# Patient Record
Sex: MALE | Race: WHITE | NOT HISPANIC OR LATINO | Employment: PART TIME | URBAN - METROPOLITAN AREA
[De-identification: names, ages, dates, MRNs, and addresses within clinical notes are randomized per-mention and may not be internally consistent; named-entity substitution may affect disease eponyms.]

---

## 2018-04-04 ENCOUNTER — COMMUNICATION - HEALTHEAST (OUTPATIENT)
Dept: FAMILY MEDICINE | Facility: CLINIC | Age: 18
End: 2018-04-04

## 2018-05-04 ENCOUNTER — OFFICE VISIT - HEALTHEAST (OUTPATIENT)
Dept: FAMILY MEDICINE | Facility: CLINIC | Age: 18
End: 2018-05-04

## 2018-05-04 DIAGNOSIS — F32.A DEPRESSION: ICD-10-CM

## 2018-05-08 ENCOUNTER — COMMUNICATION - HEALTHEAST (OUTPATIENT)
Dept: FAMILY MEDICINE | Facility: CLINIC | Age: 18
End: 2018-05-08

## 2018-05-08 DIAGNOSIS — F32.A DEPRESSION: ICD-10-CM

## 2019-05-02 ENCOUNTER — COMMUNICATION - HEALTHEAST (OUTPATIENT)
Dept: FAMILY MEDICINE | Facility: CLINIC | Age: 19
End: 2019-05-02

## 2019-05-02 DIAGNOSIS — F32.A DEPRESSION: ICD-10-CM

## 2019-08-01 ENCOUNTER — COMMUNICATION - HEALTHEAST (OUTPATIENT)
Dept: FAMILY MEDICINE | Facility: CLINIC | Age: 19
End: 2019-08-01

## 2019-08-01 DIAGNOSIS — F32.A DEPRESSION: ICD-10-CM

## 2019-08-30 ENCOUNTER — COMMUNICATION - HEALTHEAST (OUTPATIENT)
Dept: FAMILY MEDICINE | Facility: CLINIC | Age: 19
End: 2019-08-30

## 2019-08-30 DIAGNOSIS — F32.A DEPRESSION: ICD-10-CM

## 2019-10-06 ENCOUNTER — COMMUNICATION - HEALTHEAST (OUTPATIENT)
Dept: FAMILY MEDICINE | Facility: CLINIC | Age: 19
End: 2019-10-06

## 2019-10-06 DIAGNOSIS — F32.A DEPRESSION: ICD-10-CM

## 2019-10-11 ENCOUNTER — COMMUNICATION - HEALTHEAST (OUTPATIENT)
Dept: FAMILY MEDICINE | Facility: CLINIC | Age: 19
End: 2019-10-11

## 2019-10-11 DIAGNOSIS — F32.A DEPRESSION: ICD-10-CM

## 2019-11-11 ENCOUNTER — COMMUNICATION - HEALTHEAST (OUTPATIENT)
Dept: FAMILY MEDICINE | Facility: CLINIC | Age: 19
End: 2019-11-11

## 2019-11-11 DIAGNOSIS — F32.A DEPRESSION: ICD-10-CM

## 2019-12-14 ENCOUNTER — COMMUNICATION - HEALTHEAST (OUTPATIENT)
Dept: FAMILY MEDICINE | Facility: CLINIC | Age: 19
End: 2019-12-14

## 2019-12-14 ENCOUNTER — COMMUNICATION - HEALTHEAST (OUTPATIENT)
Dept: SCHEDULING | Facility: CLINIC | Age: 19
End: 2019-12-14

## 2019-12-14 DIAGNOSIS — F32.A DEPRESSION: ICD-10-CM

## 2019-12-20 ENCOUNTER — COMMUNICATION - HEALTHEAST (OUTPATIENT)
Dept: SCHEDULING | Facility: CLINIC | Age: 19
End: 2019-12-20

## 2019-12-24 ENCOUNTER — OFFICE VISIT - HEALTHEAST (OUTPATIENT)
Dept: FAMILY MEDICINE | Facility: CLINIC | Age: 19
End: 2019-12-24

## 2019-12-24 DIAGNOSIS — Z23 NEED FOR VACCINATION: ICD-10-CM

## 2019-12-24 DIAGNOSIS — F32.A DEPRESSION, UNSPECIFIED DEPRESSION TYPE: ICD-10-CM

## 2019-12-24 ASSESSMENT — PATIENT HEALTH QUESTIONNAIRE - PHQ9: SUM OF ALL RESPONSES TO PHQ QUESTIONS 1-9: 3

## 2020-01-06 ENCOUNTER — COMMUNICATION - HEALTHEAST (OUTPATIENT)
Dept: FAMILY MEDICINE | Facility: CLINIC | Age: 20
End: 2020-01-06

## 2020-01-06 DIAGNOSIS — F32.A DEPRESSION: ICD-10-CM

## 2020-01-22 ENCOUNTER — COMMUNICATION - HEALTHEAST (OUTPATIENT)
Dept: FAMILY MEDICINE | Facility: CLINIC | Age: 20
End: 2020-01-22

## 2020-01-22 DIAGNOSIS — F32.A DEPRESSION: ICD-10-CM

## 2020-11-30 ENCOUNTER — VIRTUAL VISIT (OUTPATIENT)
Dept: FAMILY MEDICINE | Facility: OTHER | Age: 20
End: 2020-11-30

## 2020-12-01 NOTE — PROGRESS NOTES
"Date: 2020 17:45:01  Clinician: Pamela Diego  Clinician NPI: [id number]  Patient: Mick Leos  Patient : 2000  Patient Address: [address] [place] [address]  Patient Phone: [phone]  Visit Protocol: URI  Patient Summary:  Mick is a 20 year old ( : 2000 ) male who initiated a [facility] Visit for COVID-19 (Coronavirus) evaluation and screening. When asked the question \"Please sign me up to receive news, health information and promotions. \", Mick responded \"No\".    When asked when his symptoms started, Mick reported that he does not have any symptoms.   He denies taking antibiotic medication in the past month and having recent facial or sinus surgery in the past 60 days.    Pertinent COVID-19 (Coronavirus) information  Mick does not work or volunteer as healthcare worker or a . In the past 14 days, Mick has not worked or volunteered at a healthcare facility or group living setting.   In the past 14 days, he also has not lived in a congregate living setting.   Mick has not had a close contact with a laboratory-confirmed COVID-19 patient within the last 14 days.    Since 2019, Mick has not been tested for COVID-19 and has had upper respiratory infection (URI) or influenza-like illness.      Date(s) of previous URI or influenza-like illness (free-text): 10/11/20-20     Symptoms Mick experienced during previous URI or influenza-like illness as reported by the patient (free-text): Dry throat, runny nose, aching        Pertinent medical history  He has not been told by his provider to avoid NSAIDs.   Mick does not have diabetes. He denies having immunosuppressive conditions (e.g., chemotherapy, HIV, organ transplant, long-term use of steroids or other immunosuppressive medications, splenectomy). He does not have severe COPD and congestive heart failure. He does not have asthma.   Mick does not need a return to " work/school note.   Weight: 165 lbs   Mick does not smoke or use smokeless tobacco.   Weight: 165 lbs    MEDICATIONS: No current medications, ALLERGIES: NKDA  Clinician Response:  Dear Mick,   Based on the details you've shared, you are concerned about contact with someone who may have been exposed to coronavirus (COVID-19). Based on [facility] guidelines you do not need to be tested at this time.  Testing for people who do not have symptoms is only required in certain instances, including direct contact with a person who has tested positive for COVID-19, or for those who are traveling to locations where testing is required beforehand.  Please redo an [facility] visit or call your clinic if you think we missed needed information, your exposure information has changed, or you develop symptoms.  What are the symptoms of COVID-19?  The most common symptoms are cough, fever and trouble breathing. Less common symptoms include headache, body aches, fatigue (feeling very tired), chills, sore throat, stuffy or runny nose, diarrhea (loose poop), loss of taste or smell, belly pain, and nausea or vomiting (feeling sick to your stomach or throwing up).  Where can I get more information?  [facility] -- About COVID-19: [email or web address]  CDC -- What to Do If You're Sick: www.cdc.gov/coronavirus/2019-ncov/about/steps-when-sick.html  CDC -- Ending Home Isolation: www.cdc.gov/coronavirus/2019-ncov/hcp/disposition-in-home-patients.html  CDC -- Caring for Someone: www.cdc.gov/coronavirus/2019-ncov/if-you-are-sick/care-for-someone.html  [facility] -- Interim Guidance for Hospital Discharge to Home: www.health.[facility].mn.us/diseases/coronavirus/hcp/hospdischarge.pdf  [facility] clinical trials (COVID-19 research studies): [email or web address].[email or web address]/umn-clinical-trials  Below are the COVID-19 hotlines at the [place] Department of Health ([facility]). Interpreters are available.  For health questions: Call  [phone] or 1-364.871.4826 (7 a.m. to 7 p.m.)  For questions about schools and childcare: Call 370-686-9469 or 1-973.724.8512 (7 a.m. to 7 p.m.)    Diagnosis: Worries  Diagnosis ICD: R45.82

## 2021-05-26 ASSESSMENT — PATIENT HEALTH QUESTIONNAIRE - PHQ9: SUM OF ALL RESPONSES TO PHQ QUESTIONS 1-9: 3

## 2021-05-28 NOTE — TELEPHONE ENCOUNTER
RN cannot approve Refill Request    RN can NOT refill this medication not protocol approved for ages 21 and younger      Socrates Florez, [facility] Triage/Med Refill 2/12/2019

## 2021-05-31 NOTE — TELEPHONE ENCOUNTER
RN cannot approve Refill Request    RN can NOT refill this medication PCP messaged that patient is overdue for Office Visit. Last office visit: 5/4/2018 Lev Gardner MD Last Physical: Visit date not found Last MTM visit: Visit date not found Last visit same specialty: 5/4/2018 Lev Gardner MD.  Next visit within 3 mo: Visit date not found  Next physical within 3 mo: Visit date not found      Brandi Nuñez, [facility] Triage/Med Refill 8/1/2019    Requested Prescriptions   Pending Prescriptions Disp Refills     sertraline (ZOLOFT) 50 MG tablet [Pharmacy Med Name: SERTRALINE HCL 50 MG TABLET] 90 tablet 0     Sig: TAKE 1 TABLET BY MOUTH EVERY DAY       [facility] Refill Protocol  Failed - 8/1/2019  1:45 AM        Failed - PCP or prescribing provider visit in last year     Last office visit with prescriber/PCP: 5/4/2018 Lev Gardner MD OR manuel dept: Visit date not found OR same specialty: 5/4/2018 Lev Gardner MD  Last physical: Visit date not found Last MTM visit: Visit date not found   Next visit within 3 mo: Visit date not found  Next physical within 3 mo: Visit date not found  Prescriber OR PCP: Lev Gardner MD  Last diagnosis associated with med order: 1. Depression  - sertraline (ZOLOFT) 50 MG tablet [Pharmacy Med Name: SERTRALINE HCL 50 MG TABLET]; TAKE 1 TABLET BY MOUTH EVERY DAY  Dispense: 90 tablet; Refill: 0    If protocol passes may refill for 12 months if within 3 months of last provider visit (or a total of 15 months).             Failed - Age 21 and younger route to prescribing provider     Last office visit with prescriber/PCP: 5/4/2018 Lev Gardner MD OR manuel dept: Visit date not found OR same specialty: 5/4/2018 Lev Gardner MD  Last physical: Visit date not found Last MTM visit: Visit date not found   Next visit within 3 mo: Visit date not found  Next physical within 3 mo: Visit date not found  Prescriber OR PCP: Lev Gardner MD  Last diagnosis associated with med  order: 1. Depression  - sertraline (ZOLOFT) 50 MG tablet [Pharmacy Med Name: SERTRALINE HCL 50 MG TABLET]; TAKE 1 TABLET BY MOUTH EVERY DAY  Dispense: 90 tablet; Refill: 0    If protocol passes may refill for 12 months if within 3 months of last provider visit (or a total of 15 months).

## 2021-05-31 NOTE — TELEPHONE ENCOUNTER
RN cannot approve Refill Request    RN can NOT refill this medication med is not covered by policy/route to provider.       Ilene Palma, [facility] Triage/Med Refill 8/30/2019    Requested Prescriptions   Pending Prescriptions Disp Refills     sertraline (ZOLOFT) 50 MG tablet [Pharmacy Med Name: SERTRALINE HCL 50 MG TABLET] 30 tablet 0     Sig: TAKE 1 TABLET BY MOUTH EVERY DAY. NO MORE REFILLS UNTIL SEEN       [facility] Refill Protocol  Failed - 8/30/2019  7:32 AM        Failed - PCP or prescribing provider visit in last year     Last office visit with prescriber/PCP: 5/4/2018 Lev Gardner MD OR same dept: Visit date not found OR same specialty: 5/4/2018 Lev Gardner MD  Last physical: Visit date not found Last MTM visit: Visit date not found   Next visit within 3 mo: Visit date not found  Next physical within 3 mo: Visit date not found  Prescriber OR PCP: Lev Gardner MD  Last diagnosis associated with med order: 1. Depression  - sertraline (ZOLOFT) 50 MG tablet [Pharmacy Med Name: SERTRALINE HCL 50 MG TABLET]; TAKE 1 TABLET BY MOUTH EVERY DAY. NO MORE REFILLS UNTIL SEEN  Dispense: 30 tablet; Refill: 0    If protocol passes may refill for 12 months if within 3 months of last provider visit (or a total of 15 months).             Failed - Age 21 and younger route to prescribing provider     Last office visit with prescriber/PCP: 5/4/2018 Lev Gardner MD OR manuel dept: Visit date not found OR same specialty: 5/4/2018 Lev Gardner MD  Last physical: Visit date not found Last MTM visit: Visit date not found   Next visit within 3 mo: Visit date not found  Next physical within 3 mo: Visit date not found  Prescriber OR PCP: Lev Gardner MD  Last diagnosis associated with med order: 1. Depression  - sertraline (ZOLOFT) 50 MG tablet [Pharmacy Med Name: SERTRALINE HCL 50 MG TABLET]; TAKE 1 TABLET BY MOUTH EVERY DAY. NO MORE REFILLS UNTIL SEEN  Dispense: 30 tablet; Refill: 0    If protocol passes  may refill for 12 months if within 3 months of last provider visit (or a total of 15 months).

## 2021-06-01 ENCOUNTER — RECORDS - HEALTHEAST (OUTPATIENT)
Dept: ADMINISTRATIVE | Facility: CLINIC | Age: 21
End: 2021-06-01

## 2021-06-01 VITALS — WEIGHT: 149 LBS | BODY MASS INDEX: 21.38 KG/M2

## 2021-06-02 NOTE — TELEPHONE ENCOUNTER
RN cannot approve Refill Request    RN can NOT refill this medication Protocol failed and NO refill given.      Ilene Palma, [facility] Triage/Med Refill 10/8/2019    Requested Prescriptions   Pending Prescriptions Disp Refills     sertraline (ZOLOFT) 50 MG tablet [Pharmacy Med Name: SERTRALINE HCL 50 MG TABLET] 30 tablet 0     Sig: TAKE 1 TABLET BY MOUTH EVERY DAY. NO MORE REFILLS UNTIL SEEN       [facility] Refill Protocol  Failed - 10/6/2019  9:33 AM        Failed - PCP or prescribing provider visit in last year     Last office visit with prescriber/PCP: 5/4/2018 Lev Gardner MD OR same dept: Visit date not found OR same specialty: 5/4/2018 Lev Gardner MD  Last physical: Visit date not found Last MTM visit: Visit date not found   Next visit within 3 mo: Visit date not found  Next physical within 3 mo: Visit date not found  Prescriber OR PCP: Lev Gardner MD  Last diagnosis associated with med order: 1. Depression  - sertraline (ZOLOFT) 50 MG tablet [Pharmacy Med Name: SERTRALINE HCL 50 MG TABLET]; TAKE 1 TABLET BY MOUTH EVERY DAY. NO MORE REFILLS UNTIL SEEN  Dispense: 30 tablet; Refill: 0    If protocol passes may refill for 12 months if within 3 months of last provider visit (or a total of 15 months).             Failed - Age 21 and younger route to prescribing provider     Last office visit with prescriber/PCP: 5/4/2018 Lev Gardner MD OR same dept: Visit date not found OR same specialty: 5/4/2018 Lev Gardner MD  Last physical: Visit date not found Last MTM visit: Visit date not found   Next visit within 3 mo: Visit date not found  Next physical within 3 mo: Visit date not found  Prescriber OR PCP: Lev Gardner MD  Last diagnosis associated with med order: 1. Depression  - sertraline (ZOLOFT) 50 MG tablet [Pharmacy Med Name: SERTRALINE HCL 50 MG TABLET]; TAKE 1 TABLET BY MOUTH EVERY DAY. NO MORE REFILLS UNTIL SEEN  Dispense: 30 tablet; Refill: 0    If protocol passes may refill  for 12 months if within 3 months of last provider visit (or a total of 15 months).

## 2021-06-02 NOTE — TELEPHONE ENCOUNTER
RN cannot approve Refill Request    RN can NOT refill this medication med is not covered by policy/route to provider. Last office visit: 5/4/2018 Lev Gardner MD Last Physical: Visit date not found Last MTM visit: Visit date not found Last visit same specialty: 5/4/2018 Lev Gardner MD.  Next visit within 3 mo: Visit date not found  Next physical within 3 mo: Visit date not found      Eleanor Jose, [facility] Triage/Med Refill 10/13/2019    Requested Prescriptions   Pending Prescriptions Disp Refills     sertraline (ZOLOFT) 50 MG tablet [Pharmacy Med Name: SERTRALINE HCL 50 MG TABLET] 30 tablet 0     Sig: TAKE 1 TABLET BY MOUTH EVERY DAY. NO MORE REFILLS UNTIL SEEN       [facility] Refill Protocol  Failed - 10/11/2019 11:23 AM        Failed - PCP or prescribing provider visit in last year     Last office visit with prescriber/PCP: 5/4/2018 Lev Gardner MD OR manuel dept: Visit date not found OR same specialty: 5/4/2018 Lev Gardner MD  Last physical: Visit date not found Last MTM visit: Visit date not found   Next visit within 3 mo: Visit date not found  Next physical within 3 mo: Visit date not found  Prescriber OR PCP: Lev Gardner MD  Last diagnosis associated with med order: 1. Depression  - sertraline (ZOLOFT) 50 MG tablet [Pharmacy Med Name: SERTRALINE HCL 50 MG TABLET]; TAKE 1 TABLET BY MOUTH EVERY DAY. NO MORE REFILLS UNTIL SEEN  Dispense: 30 tablet; Refill: 0    If protocol passes may refill for 12 months if within 3 months of last provider visit (or a total of 15 months).             Failed - Age 21 and younger route to prescribing provider     Last office visit with prescriber/PCP: 5/4/2018 Lev Gardner MD OR manuel dept: Visit date not found OR same specialty: 5/4/2018 Lev Gardner MD  Last physical: Visit date not found Last MTM visit: Visit date not found   Next visit within 3 mo: Visit date not found  Next physical within 3 mo: Visit date not found  Prescriber OR PCP:  Lev Gardner MD  Last diagnosis associated with med order: 1. Depression  - sertraline (ZOLOFT) 50 MG tablet [Pharmacy Med Name: SERTRALINE HCL 50 MG TABLET]; TAKE 1 TABLET BY MOUTH EVERY DAY. NO MORE REFILLS UNTIL SEEN  Dispense: 30 tablet; Refill: 0    If protocol passes may refill for 12 months if within 3 months of last provider visit (or a total of 15 months).

## 2021-06-03 NOTE — TELEPHONE ENCOUNTER
RN cannot approve Refill Request    RN can NOT refill this medication med is not covered by policy/route to provider. Last office visit: 5/4/2018 Lev Gardner MD Last Physical: Visit date not found Last MTM visit: Visit date not found Last visit same specialty: 5/4/2018 Lev Gardner MD.  Next visit within 3 mo: Visit date not found  Next physical within 3 mo: Visit date not found      Sarah Ivey, [facility] Triage/Med Refill 11/11/2019    Requested Prescriptions   Pending Prescriptions Disp Refills     sertraline (ZOLOFT) 50 MG tablet [Pharmacy Med Name: SERTRALINE HCL 50 MG TABLET] 30 tablet 0     Sig: TAKE 1 TABLET BY MOUTH EVERY DAY. NO MORE REFILLS UNTIL SEEN       [facility] Refill Protocol  Failed - 11/11/2019  9:33 AM        Failed - PCP or prescribing provider visit in last year     Last office visit with prescriber/PCP: 5/4/2018 Lev Gardner MD OR manuel dept: Visit date not found OR same specialty: 5/4/2018 Lev Gardner MD  Last physical: Visit date not found Last MTM visit: Visit date not found   Next visit within 3 mo: Visit date not found  Next physical within 3 mo: Visit date not found  Prescriber OR PCP: Lev Gardner MD  Last diagnosis associated with med order: 1. Depression  - sertraline (ZOLOFT) 50 MG tablet [Pharmacy Med Name: SERTRALINE HCL 50 MG TABLET]; TAKE 1 TABLET BY MOUTH EVERY DAY. NO MORE REFILLS UNTIL SEEN  Dispense: 30 tablet; Refill: 0    If protocol passes may refill for 12 months if within 3 months of last provider visit (or a total of 15 months).             Failed - Age 21 and younger route to prescribing provider     Last office visit with prescriber/PCP: 5/4/2018 Lev Gardner MD OR manuel dept: Visit date not found OR same specialty: 5/4/2018 Lev Gardner MD  Last physical: Visit date not found Last MTM visit: Visit date not found   Next visit within 3 mo: Visit date not found  Next physical within 3 mo: Visit date not found  Prescriber OR PCP:  Lev Gardner MD  Last diagnosis associated with med order: 1. Depression  - sertraline (ZOLOFT) 50 MG tablet [Pharmacy Med Name: SERTRALINE HCL 50 MG TABLET]; TAKE 1 TABLET BY MOUTH EVERY DAY. NO MORE REFILLS UNTIL SEEN  Dispense: 30 tablet; Refill: 0    If protocol passes may refill for 12 months if within 3 months of last provider visit (or a total of 15 months).

## 2021-06-04 VITALS
BODY MASS INDEX: 26.43 KG/M2 | HEART RATE: 65 BPM | WEIGHT: 184.2 LBS | OXYGEN SATURATION: 97 % | DIASTOLIC BLOOD PRESSURE: 64 MMHG | SYSTOLIC BLOOD PRESSURE: 99 MMHG

## 2021-06-04 NOTE — TELEPHONE ENCOUNTER
Refill Request  Did you contact pharmacy: Yes  Medication name:   Requested Prescriptions     Pending Prescriptions Disp Refills     sertraline (ZOLOFT) 50 MG tablet 30 tablet 0     Who prescribed the medication: Kendra  Pharmacy Name and Location: [facility]  Is patient out of medication: No.  1 days left  Patient notified refills processed in 72 hours:  yes  Okay to leave a detailed message: yes    Briana Canseco RN, BSN [facility] Triage Nurse

## 2021-06-04 NOTE — PATIENT INSTRUCTIONS - HE
As discussed, wean off of the sertraline, if you are doing well we can keep you off of this.  See us in the next 1 to 2 years for a physical though sooner as needed.

## 2021-06-04 NOTE — TELEPHONE ENCOUNTER
Patient calling back as he states he just received a call. Unsure if someone called or if it is a reminder for his appointment on the 24th.  Last documentation in chart was from 12/16 but pt was already aware he needed appointment for his zoloft rx.   Pt stated he will listen to voicemail and will call back if needed.   Carole Roger, RN   [facility] RN Triage    Reason for Disposition    [1] Follow-up call to recent contact AND [2] information only call, no triage required    Protocols used: INFORMATION ONLY CALL-A-AH

## 2021-06-04 NOTE — TELEPHONE ENCOUNTER
RN cannot approve Refill Request    RN can NOT refill this medication PCP messaged that patient is overdue for Office Visit. Last office visit: 5/4/2018 Lev Gardner MD Last Physical: Visit date not found Last MTM visit: Visit date not found Last visit same specialty: 5/4/2018 Lev Gardner MD.  Next visit within 3 mo: Visit date not found  Next physical within 3 mo: Visit date not found    Patient is coming in on 12/24/19 for a med check.   Briana Canseco, [facility] Triage/Med Refill 12/14/2019    Requested Prescriptions   Pending Prescriptions Disp Refills     sertraline (ZOLOFT) 50 MG tablet 30 tablet 0       There is no refill protocol information for this order

## 2021-06-04 NOTE — TELEPHONE ENCOUNTER
RN Triage:     Patient calling in requesting a refill of zoloft, He has one pill left. Please see refill encounter.     Briana Canseco RN, BSN [facility] Triage Nurse

## 2021-06-04 NOTE — PROGRESS NOTES
ASSESSMENT:  1. Depression  Patient with a longstanding history of depression, though is not clear if he truly needs sertraline, he weaned off BuSpar without any difficulty.    2. Need for vaccination     - Tdap vaccine,  6yo or older,  IM        PLAN:  1.  Wean off sertraline, currently 50 mg daily, take half a tablet or 25 mg daily for 2 weeks then 25 mg every other day for 2 weeks then discontinue.  2.  Discussed with the patient that over the next several months I think it will become clear if he is able to do well without the sertraline.  3.  Dap  4.  Patient will be due for a physical in the next 1 to 2 years, see earlier as needed.    Orders Placed This Encounter   Procedures     Tdap vaccine,  6yo or older,  IM     Medications Discontinued During This Encounter   Medication Reason     busPIRone (BUSPAR) 15 MG tablet Therapy completed     sertraline (ZOLOFT) 50 MG tablet Therapy completed       Return in about 3 months (around 3/24/2020) for Annual physical.    CHIEF COMPLAINT:  Chief Complaint   Patient presents with     Depression     recheck        SUBJECTIVE:  Mick is a 19 y.o. male coming in clinic today for a depression recheck. He was last seen in clinic 05/04/2018 for an office visit about depression.    Depression/Medication: The patient reports that he is in a better position mentally than his last visit. He is currently still taking the medication, but feels that it did not help much. He is willing to try and wean off the medications.     Health Maintenance: He is updating shots today. He had gotten his flu shot at his dad's workplace clinic. He reports being bedridden for a few days after getting the flu shot.    Review of Systems:   All other systems are negative.     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   He dropped out of college. If he were to ever go back to school he would want to go for an .  He is currently in a relationship.  His mom has  depression.  His brother has anxiety.    Immunization History   Administered Date(s) Administered     DTaP, historic 2000, 2000, 01/20/1999, 08/18/1999, 07/01/2003     Dtap 2000, 2000, 01/20/1999, 08/18/1999, 07/01/2003     Hep A, Adult IM (19yr & older) 08/04/2009, 04/09/2010     Hep A, historic 08/04/2009, 04/09/2010     Hep B / HiB 2000, 2000, 05/12/1999     IPV 05/02/2000, 07/01/2003     Influenza, Seasonal, Inj PF IIV3 11/07/2011     Influenza, inj, historic,unspecified 12/10/2008     Influenza,seasonal, Inj IIV3 12/09/2006, 12/10/2008     MMR 05/12/1999, 07/01/2003     Meningococcal MCV4P 05/23/2011, 03/27/2015     OPV,Trivalent,Historic([id number] only) 2000, 2000, 01/20/1999     Td,adult,historic,unspecified 08/04/2009     Tdap 08/04/2009, 12/24/2019     Varicella 11/05/1999, 08/04/2009     Social History     Socioeconomic History     Marital status: Single     Spouse name: Not on file     Number of children: Not on file     Years of education: Not on file     Highest education level: Not on file   Occupational History     Occupation: change oil   Social Needs     Financial resource strain: Not on file     Food insecurity:     Worry: Not on file     Inability: Not on file     Transportation needs:     Medical: Not on file     Non-medical: Not on file   Tobacco Use     Smoking status: Never Smoker     Smokeless tobacco: Never Used   Substance and Sexual Activity     Alcohol use: Not on file     Drug use: Not on file     Sexual activity: Not on file   Lifestyle     Physical activity:     Days per week: Not on file     Minutes per session: Not on file     Stress: Not on file   Relationships     Social connections:     Talks on phone: Not on file     Gets together: Not on file     Attends Shinto service: Not on file     Active member of club or organization: Not on file     Attends meetings of clubs or organizations: Not on file     Relationship status: Not on  file     Intimate partner violence:     Fear of current or ex partner: Not on file     Emotionally abused: Not on file     Physically abused: Not on file     Forced sexual activity: Not on file   Other Topics Concern     Not on file   Social History Narrative    Diet-        Exercise-     History reviewed. No pertinent past medical history.  Family History   Problem Relation Age of Onset     Depression Mother      Anxiety disorder Brother        MEDICATIONS:  Current Outpatient Medications   Medication Sig Dispense Refill     ibuprofen (ADVIL,MOTRIN) 600 MG tablet        No current facility-administered medications for this visit.        TOBACCO USE:  Social History     Tobacco Use   Smoking Status Never Smoker   Smokeless Tobacco Never Used       VITALS:  Vitals:    12/24/19 1001   BP: 99/64   Pulse: 65   SpO2: 97%   Weight: 184 lb 3.2 oz (83.6 kg)     Wt Readings from Last 3 Encounters:   12/24/19 184 lb 3.2 oz (83.6 kg)   05/04/18 149 lb (67.6 kg)   08/05/16 137 lb 8 oz (62.4 kg) (29 %, Z= -0.54)*     * Growth percentiles are based on [facility] (Boys, 2-20 Years) data.       PHYSICAL EXAM:  Constitutional:   Reveals a healthy-appearing young male.  Vitals: per nursing notes.  Musculoskeletal: No peripheral swelling.  Neuro:  Alert and oriented. Cranial nerves, motor, sensory exams are intact.  No gross focal deficits.  Psychiatric:  Memory intact, mood appropriate.      DATA REVIEWED:  Additional History from Old Records Summarized (2): 05/04/2018 Office Visit note reviewed about depression.  Decision to Obtain Records (1): None.  Radiology Tests Summarized or Ordered (1): None.  Labs Reviewed or Ordered (1): None.  Medicine Test Summarized or Ordered (1): None.  Independent Review of EKG, X-RAY, or RAPID STREP (2 each): None.    The visit lasted a total of 9 minutes face to face with the patient. Over 50% of the time was spent counseling and educating the patient about depression.    Yvette SYED, am scribing for and  in the presence of, Dr. Gardner.    I, Dr. Gardner, personally performed the services described in this documentation, as scribed by Yvette Matamoros in my presence, and it is both accurate and complete.    Total data points: 2

## 2021-06-05 NOTE — TELEPHONE ENCOUNTER
RN cannot approve Refill Request    RN can NOT refill this medication Protocol failed and NO refill given.       Ilene Palma, [facility] Triage/Med Refill 1/7/2020    Requested Prescriptions   Pending Prescriptions Disp Refills     sertraline (ZOLOFT) 50 MG tablet [Pharmacy Med Name: SERTRALINE HCL 50 MG TABLET] 30 tablet 0     Sig: TAKE 1 TABLET BY MOUTH EVERY DAY. NO MORE REFILLS UNTIL SEEN       [facility] Refill Protocol  Failed - 1/6/2020 12:34 PM        Failed - Age 21 and younger route to prescribing provider     Last office visit with prescriber/PCP: 12/24/2019 Lev Gardner MD OR manuel dept: 12/24/2019 Lev Gardner MD OR same specialty: 12/24/2019 Lev Gardner MD  Last physical: Visit date not found Last MTM visit: Visit date not found   Next visit within 3 mo: Visit date not found  Next physical within 3 mo: Visit date not found  Prescriber OR PCP: Lev Gardner MD  Last diagnosis associated with med order: 1. Depression  - sertraline (ZOLOFT) 50 MG tablet [Pharmacy Med Name: SERTRALINE HCL 50 MG TABLET]; TAKE 1 TABLET BY MOUTH EVERY DAY. NO MORE REFILLS UNTIL SEEN  Dispense: 30 tablet; Refill: 0    If protocol passes may refill for 12 months if within 3 months of last provider visit (or a total of 15 months).             Passed - PCP or prescribing provider visit in last year     Last office visit with prescriber/PCP: 12/24/2019 Lev Gardner MD OR manuel dept: 12/24/2019 Lev Gardner MD OR same specialty: 12/24/2019 Lev Gardner MD  Last physical: Visit date not found Last MTM visit: Visit date not found   Next visit within 3 mo: Visit date not found  Next physical within 3 mo: Visit date not found  Prescriber OR PCP: Lev Gardner MD  Last diagnosis associated with med order: 1. Depression  - sertraline (ZOLOFT) 50 MG tablet [Pharmacy Med Name: SERTRALINE HCL 50 MG TABLET]; TAKE 1 TABLET BY MOUTH EVERY DAY. NO MORE REFILLS UNTIL SEEN  Dispense: 30 tablet; Refill: 0    If  protocol passes may refill for 12 months if within 3 months of last provider visit (or a total of 15 months).

## 2021-06-05 NOTE — TELEPHONE ENCOUNTER
RN cannot approve Refill Request    RN can NOT refill this medication Protocol failed and NO refill given.      Ilene Palma, [facility] Triage/Med Refill 1/22/2020    Requested Prescriptions   Pending Prescriptions Disp Refills     sertraline (ZOLOFT) 50 MG tablet 30 tablet 0     Sig: TAKE 1 TABLET BY MOUTH EVERY DAY. NO MORE REFILLS UNTIL SEEN       [facility] Refill Protocol  Failed - 1/22/2020  6:13 PM        Failed - Age 21 and younger route to prescribing provider     Last office visit with prescriber/PCP: 12/24/2019 Lev Gardner MD OR manuel dept: 12/24/2019 Lev Gardner MD OR same specialty: 12/24/2019 Lev Gardner MD  Last physical: Visit date not found Last MTM visit: Visit date not found   Next visit within 3 mo: Visit date not found  Next physical within 3 mo: Visit date not found  Prescriber OR PCP: Lev Gardner MD  Last diagnosis associated with med order: 1. Depression  - sertraline (ZOLOFT) 50 MG tablet; TAKE 1 TABLET BY MOUTH EVERY DAY. NO MORE REFILLS UNTIL SEEN  Dispense: 30 tablet; Refill: 0    If protocol passes may refill for 12 months if within 3 months of last provider visit (or a total of 15 months).             Passed - PCP or prescribing provider visit in last year     Last office visit with prescriber/PCP: 12/24/2019 Lev Gardner MD OR manuel dept: 12/24/2019 Lev Gardner MD OR same specialty: 12/24/2019 Lev Gardner MD  Last physical: Visit date not found Last MTM visit: Visit date not found   Next visit within 3 mo: Visit date not found  Next physical within 3 mo: Visit date not found  Prescriber OR PCP: Lev Gardner MD  Last diagnosis associated with med order: 1. Depression  - sertraline (ZOLOFT) 50 MG tablet; TAKE 1 TABLET BY MOUTH EVERY DAY. NO MORE REFILLS UNTIL SEEN  Dispense: 30 tablet; Refill: 0    If protocol passes may refill for 12 months if within 3 months of last provider visit (or a total of 15 months).

## 2021-06-17 NOTE — PROGRESS NOTES
ASSESSMENT:  1. Depression  Patient has had a several year history of depression, does seem to be responding well to a combination of BuSpar and sertraline.  Patient does have some delayed ejaculation from sertraline seems tolerable.      PLAN:  1.  The patient does not currently but at some point will need both the BuSpar and sertraline renewed.  2.  Patient should be seen again in 1 year.      No orders of the defined types were placed in this encounter.    Medications Discontinued During This Encounter   Medication Reason     sertraline (ZOLOFT) 100 MG tablet Therapy completed     HYDROcodone-acetaminophen 5-325 mg per tablet Therapy completed     busPIRone (BUSPAR) 15 MG tablet Reorder       No Follow-up on file.    CHIEF COMPLAINT:  Chief Complaint   Patient presents with     Medication Management     antidepressants ar working well        SUBJECTIVE:  Mick is a 18 y.o. male presenting to the clinic today for a medication check.    Depression: He is currently taking 15 mg of buspirone and 50 mg of sertraline for his depression. He was formerly on fluoxetine, but discontinued its use and switched to sertraline which has more optimally controlled his mood. He has noticed some sexually related side effects on the sertraline; he has had a harder time achieving orgasm. He notes that this does annoy him but also greatly annoys his partner. He does believe that he needs to stay on the current dose of the medication. In addition to pharmacological intervention, he formerly saw a psychiatrist. He has not had an appointment in a number of years but states he would be open to seeing one again should his condition worsen.     Health Maintenance: He is up-to-date on his immunizations.     REVIEW OF SYSTEMS:   All other systems are negative.    PFSH:  Social: He plans to go back to school in the near future.   Immunization History   Administered Date(s) Administered     DTaP, historic 1998, 1998, 01/20/1999,  08/18/1999, 07/01/2003     Dtap 1998, 1998, 01/20/1999, 08/18/1999, 07/01/2003     Hep A, Adult IM (19yr & older) 08/04/2009, 04/09/2010     Hep A, historic 08/04/2009, 04/09/2010     Hep B / HiB 1998, 1998, 05/12/1999     IPV 05/02/2000, 07/01/2003     Influenza, Seasonal, Inj PF IIV3 11/07/2011     Influenza, inj, historic,unspecified 12/10/2008     Influenza,seasonal, Inj IIV3 12/09/2006, 12/10/2008     MMR 05/12/1999, 07/01/2003     Meningococcal MCV4P 05/23/2011, 03/27/2015     OPV,Trivalent,Historic(2290-1846 only) 1998, 1998, 01/20/1999     Td,adult,historic,unspecified 08/04/2009     Tdap 08/04/2009     Varicella 11/05/1999, 08/04/2009     Social History     Social History     Marital status: Single     Spouse name: N/A     Number of children: N/A     Years of education: N/A     Occupational History     Not on file.     Social History Main Topics     Smoking status: Never Smoker     Smokeless tobacco: Never Used     Alcohol use Not on file     Drug use: Not on file     Sexual activity: Not on file     Other Topics Concern     Not on file     Social History Narrative     MEDICATIONS:  Current Outpatient Prescriptions   Medication Sig Dispense Refill     busPIRone (BUSPAR) 15 MG tablet Take one pill daily 90 tablet 3     ibuprofen (ADVIL,MOTRIN) 600 MG tablet        sertraline (ZOLOFT) 50 MG tablet Take 1 tablet (50 mg total) by mouth daily. 30 tablet 0     No current facility-administered medications for this visit.        TOBACCO USE:  History   Smoking Status     Never Smoker   Smokeless Tobacco     Never Used       VITALS:  Vitals:    05/04/18 1052   BP: 90/60   Pulse: 66   Weight: 149 lb (67.6 kg)     Wt Readings from Last 3 Encounters:   05/04/18 149 lb (67.6 kg)   08/05/16 137 lb 8 oz (62.4 kg) (29 %, Z= -0.54)*   08/21/15 124 lb (56.2 kg) (16 %, Z= -1.01)*     * Growth percentiles are based on CDC 2-20 Years data.       PHYSICAL EXAM:  Constitutional:   Reveals a  pleasant male that appears stated age.  Vitals: per nursing notes.  Neuro:  Alert and oriented. Cranial nerves, motor, sensory exams are intact.  No gross focal deficits.  Psychiatric:  Memory intact, mood appropriate.    DATA REVIEWED:  Additional History from Old Records Summarized (2): Reviewed progress note 8/5/16; syncope.   Decision to Obtain Records (1): None.   Radiology Tests Summarized or Ordered (1): None.   Labs Reviewed or Ordered (1): None.   Medicine Test Summarized or Ordered (1): None.   Independent Review of EKG, X-RAY, or RAPID STREP (2 each): None.     The visit lasted a total of 7 minutes face to face with the patient. Over 50% of the time was spent counseling and educating the patient about medication management.    IWes, am scribing for and in the presence of, Dr. Gardner.    IDr. Gardner, personally performed the services described in this documentation, as scribed by Wes Randhawa in my presence, and it is both accurate and complete.    Total Data Points: 2   no

## 2022-12-07 ENCOUNTER — NURSE TRIAGE (OUTPATIENT)
Dept: NURSING | Facility: CLINIC | Age: 22
End: 2022-12-07

## 2022-12-07 NOTE — TELEPHONE ENCOUNTER
Pt is phoning stating that he has been coughing and running a fever     Pt is very tired     Temperature 100.2 - orally     Pt completed a COVID test on Monday 11/05/2022 which resulted negative     Per disposition: Go To Office Now    Care advice given per protocol and when to call back. Pt verbalized understanding and agrees to plan of care.    Jana Chao RN  [place] Nurse Advisor  2:07 PM 12/7/2022      Reason for Disposition    Fever > 103 F (39.4 C)    Additional Information    Negative: Bluish (or gray) lips or face    Negative: SEVERE difficulty breathing (e.g., struggling for each breath, speaks in single words)    Negative: Rapid onset of cough and has hives    Negative: Coughing started suddenly after medicine, an allergic food or bee sting    Negative: Difficulty breathing after exposure to flames, smoke, or fumes    Negative: Sounds like a life-threatening emergency to the triager    Negative: Previous asthma attacks and this feels like asthma attack    Negative: Dry cough (non-productive; no sputum or minimal clear sputum) and within 14 days of COVID-19 Exposure    Negative: MODERATE difficulty breathing (e.g., speaks in phrases, SOB even at rest, pulse 100-120) and still present when not coughing    Negative: Chest pain present when not coughing    Negative: Passed out (i.e., fainted, collapsed and was not responding)    Negative: Patient sounds very sick or weak to the triager    Negative: MILD difficulty breathing (e.g., minimal/no SOB at rest, SOB with walking, pulse <100) and still present when not coughing    Negative: Coughed up > 1 tablespoon (15 ml) blood (Exception: Blood-tinged sputum.)    Protocols used: COUGH-A-OH

## 2024-01-02 ENCOUNTER — OFFICE VISIT (OUTPATIENT)
Dept: FAMILY MEDICINE | Facility: CLINIC | Age: 24
End: 2024-01-02
Payer: COMMERCIAL

## 2024-01-02 VITALS
BODY MASS INDEX: 24.11 KG/M2 | DIASTOLIC BLOOD PRESSURE: 80 MMHG | OXYGEN SATURATION: 98 % | HEART RATE: 68 BPM | SYSTOLIC BLOOD PRESSURE: 102 MMHG | WEIGHT: 168 LBS | TEMPERATURE: 98.4 F

## 2024-01-02 DIAGNOSIS — Z11.59 NEED FOR HEPATITIS C SCREENING TEST: ICD-10-CM

## 2024-01-02 DIAGNOSIS — M25.531 RIGHT WRIST PAIN: ICD-10-CM

## 2024-01-02 DIAGNOSIS — F32.A DEPRESSION, UNSPECIFIED DEPRESSION TYPE: ICD-10-CM

## 2024-01-02 DIAGNOSIS — Z11.4 SCREENING FOR HIV (HUMAN IMMUNODEFICIENCY VIRUS): Primary | ICD-10-CM

## 2024-01-02 PROCEDURE — 99203 OFFICE O/P NEW LOW 30 MIN: CPT | Performed by: FAMILY MEDICINE

## 2024-01-02 ASSESSMENT — ENCOUNTER SYMPTOMS: NUMBNESS: 1

## 2024-01-02 NOTE — PROGRESS NOTES
Assessment & Plan       (F32.A) Depression, unspecified depression type  Comment: Appears to be in remission at this time  Plan:      (M25.531) Right wrist pain  Comment: New onset right wrist pain there is some numbing, I am more suspicious for tendinitis though I certainly cannot exclude a neuropathy.  Plan:      PLAN:  1.  Terms of the right wrist pain for now watchful waiting, the patient is going to continue to wear a splint, he will try to minimize use of his right hand.  2.  If by mid January or so the patient has not improved with then consider occupational therapy referral  3.  Watchful waiting for the history of depression also the patient will be due for a physical sometime in the next 6 to 12 months.                 Lev Gardner MD  [facility]   Mick is a 23 year old, presenting for the following health issues:  Patient comes in because for about the past week or so he has had some pain and discomfort in the right wrist area sometimes it is in the distal forearm area and some numbing and tingling in the second third and fourth fingers.    This was not preceded by any injury or change in his usual level of activity but he does a lot of typing will, and essentially he is using his hands and fingers quite a bit throughout the day.    I am not able to reproduce any significant amount of pain or tenderness but I told the patient that my suspicion is that this is more likely related to a tendinitis, it is possible its carpal tunnel but I would be conservative on this.  He is not taking any medication for this.  I told the patient I actually would start with occupational therapy.  He reports of some insurance issues he would like to hold off on this but I told him lets wait until mid January but if he is not improving then I would have him see occupational therapy    In the meantime he is using a brace which he thinks is helpful and he will try to do  what he can to limit use and activity of his right hand    I have treated the patient in the past for depression and he previously was on sertraline though he has not been on the medication for quite some time and does feel like his depression is well-controlled without medication.              Numbness        1/2/2024     9:11 AM   Additional Questions   Roomed by Elyse ELIZONDO       History of Present Illness       Reason for visit:  Wrist discomfort  Symptom onset:  3-7 days ago  Symptoms include:  Soreness in wrist and tingling fingers  Symptom intensity:  Moderate  Symptom progression:  Staying the same  Had these symptoms before:  No  What makes it worse:  Using a computer mouse  What makes it better:  Heat and ice    He eats 0-1 servings of fruits and vegetables daily.He consumes 1 sweetened beverage(s) daily.He exercises with enough effort to increase his heart rate 30 to 60 minutes per day.  He exercises with enough effort to increase his heart rate 4 days per week.   He is taking medications regularly.                 Review of Systems   Neurological:  Positive for numbness.            Objective    /80   Pulse 68   Temp 98.4  F (36.9  C) (Temporal)   Wt 76.2 kg (168 lb)   SpO2 98%   BMI 24.11 kg/m    Body mass index is 24.11 kg/m .  Physical Exam   Right wrist examination.  There is no obvious deformity or swelling of note I really do not reproduce any specific tenderness to palpation of the right wrist, the patient has full range of motion in terms of finger movement.

## 2024-01-15 ENCOUNTER — TELEPHONE (OUTPATIENT)
Dept: FAMILY MEDICINE | Facility: CLINIC | Age: 24
End: 2024-01-15
Payer: COMMERCIAL

## 2024-01-15 DIAGNOSIS — M25.531 RIGHT WRIST PAIN: Primary | ICD-10-CM

## 2024-01-15 NOTE — TELEPHONE ENCOUNTER
Order/Referral Request    Who is requesting: Patient    Orders being requested: OT orders for (M25.531) Right wrist pain     Reason service is needed/diagnosis: Right wrist pain not getting better. Pt was seen on 01/02/2024 and was told to contact PCP if the pain was not getting any better.    When are orders needed by: as soon as possible.    Has this been discussed with Provider: Yes    Does patient have a preference on a Group/Provider/Facility? [facility]    Does patient have an appointment scheduled?: No    Where to send orders: Place orders within Epic    Okay to leave a detailed message?: Yes at Home number on file [phone] (home)    Ebony Taylor

## 2024-01-17 NOTE — TELEPHONE ENCOUNTER
Called and spoke with patient regarding provider's message.   Patient did receive a call from OT yesterday, has no further questions or concerns.     [facility], January 17, 2024, 9:24 AM

## 2024-01-29 ENCOUNTER — THERAPY VISIT (OUTPATIENT)
Dept: OCCUPATIONAL THERAPY | Facility: REHABILITATION | Age: 24
End: 2024-01-29
Attending: FAMILY MEDICINE
Payer: COMMERCIAL

## 2024-01-29 DIAGNOSIS — M25.531 RIGHT WRIST PAIN: ICD-10-CM

## 2024-01-29 PROCEDURE — 97110 THERAPEUTIC EXERCISES: CPT | Mod: GO | Performed by: OCCUPATIONAL THERAPIST

## 2024-01-29 PROCEDURE — 97530 THERAPEUTIC ACTIVITIES: CPT | Mod: GO | Performed by: OCCUPATIONAL THERAPIST

## 2024-01-29 PROCEDURE — 97165 OT EVAL LOW COMPLEX 30 MIN: CPT | Mod: GO | Performed by: OCCUPATIONAL THERAPIST

## 2024-01-29 NOTE — PROGRESS NOTES
OCCUPATIONAL THERAPY EVALUATION  Type of Visit: Evaluation    See electronic medical record for Abuse and Falls Screening details.    Subjective      Presenting condition or subjective complaint: Pain in wrist, numbness  Date of onset: 01/15/24 (Referral)    Relevant medical history: Depression   Dates & types of surgery:      Patient comes to therapy today for evaluation and treatment of right-sided wrist pain and numbness. He reports that symptoms began approximately one month ago with relatively sudden, insidious onset, though believes it may be associated with use of a new, dense mouse pad while keyboarding. He has been wearing a pre-stephanie wrist cock-up on a relatively constant basis with some relief. 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 Also of note, patient is enrolled now in school now for [employer].     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  Hobbies/Interests: TTRPGs, will, paintingVideo games, board games, painting     Patient goals for therapy: Use a computer, write normally     Objective   ADDITIONAL HISTORY:  Right hand dominant  Patient reports symptoms of pain, stiffness/loss of motion, weakness/loss of strength, numbness, and tingling   Transportation: drives  Currently working in normal job without restrictions    Functional Outcome Measure:   Upper Extremity Functional Index  Score:  SCORE:   Column Totals: /80: 49   (A lower score indicates greater disability.)    PAIN:  Pain Level at Rest: 0/10  Pain Level with Use: 2/10  Pain Location: Paraesthesia primarily in median distribution, most often to middle finger.   Pain Quality: Aching and Tingling  Pain Frequency: intermittent  Pain is Worst: daytime or nighttime  Pain is Exacerbated By: Lifting, typing, writing   Pain is Relieved By: rest and orthotic wear/repositioning   Pain Progression: Unchanged    POSTURE: Forward Neck Posture     EDEMA:  None appreciable to palpation about the elbow, FA, wrist, thumb and digits.       SENSATION: Decreased Median Nerve distribution per pt report      ROM:  AROM of the bilateral FA, thumb, wrist, and digits grossly WNL bilaterally.  Notable hyperextension of the bilateral elbows.     SPECIAL TESTS:   CTS Special Tests  Pain Report Left Right   Median Nerve Compression at Pronator NT Trace, also trace with compression at lacertus fibrosus   Carpal Compression Test-Durkan Test (30 sec) NT Negative   Gay Test for Lumbrical Incursion (fist x30 sec) NT Negative   Tinel's at Carpal Tunnel NT Negative   Phalen's Sign NT Pain with Phalen's, paraesthesia with reverse Phalen's      Finkelstein's Test NT Trace    WHAT Test NT +     NEURAL TENSION TESTING: MNT: Median Neurodynamic Test (based on DS Lisa's ULNT)   1/29/2024   0-5 Scale 3/5, S1/S2   Position:   0/5: Arm across abdomen in coronal plane  1/5: Depress shoulder, ER to neutral ABD shoulder to 45 degrees  2/5: ER shoulder to end range, keep elbow at 90 degrees  3/5: Extend elbow to 0 degrees  4/5: Fully supinate forearm  5/5: Extend wrist, fingers and thumb  Notes:  (+) indicates beyond grade level but less than residential to next level  (-) indicates over residential to level  S1 onset/change of patient's symptoms  S2 definite stop point based on patient's discomfort level    RESISTED TESTING:  Trace right APL, EPB, FCR/FCU, ECRL/B      STRENGTH:      Measured in pounds 1/29/2024 1/29/2024    Left Right   Trial 1 58# 49#   Trial 2     Trial 3     Average       Assessment & Plan   CLINICAL IMPRESSIONS  Medical Diagnosis: Right wrist pain    Treatment Diagnosis: Right wrist pain    Impression/Assessment: Pt is a 23 year old male presenting to Occupational Therapy due to right wrist pain.  The following significant findings have been identified: Impaired activity tolerance, Impaired coordination, Impaired ROM, Impaired sensation, Impaired strength, and Pain.  These identified deficits interfere with their ability to perform self care tasks, work tasks, recreational activities, household chores, driving , medication management,  yard work, and meal planning and preparation as compared to previous level of function.   Patient's limitations or Problem List includes: Pain, Decreased ROM/motion, Weakness, Sensory disturbance, Hypermobility, Decreased , Decreased pinch, Decreased coordination, Decreased dexterity, and Tightness in musculature of the right wrist, hand, thumb, index finger, long finger, and ring finger which interferes with the patient's ability to perform Self Care Tasks (dressing), Work Tasks, Sleep Patterns, Recreational Activities, Household Chores, and Driving  as compared to previous level of function.    Clinical Decision Making (Complexity):  Assessment of Occupational Performance: 3-5 Performance Deficits  Occupational Performance Limitations: dressing, communication management, driving and community mobility, health management and maintenance, home establishment and management, meal preparation and cleanup, shopping, sleep, work, leisure activities, and social participation  Clinical Decision Making (Complexity): Low complexity    PLAN OF CARE  Treatment Interventions:  Modalities:  US  Therapeutic Exercise:  AROM, AAROM, PROM, Tendon Gliding, Blocking, Reverse Blocking, Place and Hold, Contract Relax, Extensor Tracking, Isotonics,  Isometrics, and Stabilization  Neuromuscular re-education:  Nerve Gliding, Coordination/Dexterity, Sensory re-education, Desensitization, Kinesthetic Training, Proprioceptive Training, Posture, Kinesiotaping, Strain Counter Strain, Isometrics, and Stabilization  Manual Techniques:  Coordination/Dexterity, Joint mobilization, Friction massage, Myofascial release, and Manual edema mobilization  Orthotic Fabrication:  Static, Hand based, and Forearm based  Self Care:  Self Care Tasks, Ergonomic Considerations, and Work Tasks    Long Term Goals   OT Goal 1  Goal Identifier: Goal I  Goal Description: Patient will exhibit 56# or more of RUE  strength for increased activity tolerance with ADL, iADL and work.  Rationale: In order to maximize safety and independence with performance of self-care activities;In order to maximize safety and independence with ADL/IADLs  Goal Progress: New  Target Date: 03/25/24      Frequency of Treatment: 1x/week  Duration of Treatment: 8 weeks     Education Assessment: Learner/Method: Patient;No Barriers to Learning;Pictures/Video;Demonstration;Reading;Listening     Risks and benefits of evaluation/treatment have been explained.   Patient/Family/caregiver agrees with Plan of Care.     Evaluation Time:    OT Eval, Low Complexity Minutes (99262): 20    Signing Clinician: CATHERINE Marquis [facility]                                                                                   OUTPATIENT OCCUPATIONAL THERAPY      PLAN OF TREATMENT FOR OUTPATIENT REHABILITATION   Patient's Last Name, First Name, Mick Jules YOB: 2000   Provider's Name   [facility]   Medical Record No.  [id number]     Onset Date: 01/15/24 (Referral) Start of Care Date: 01/29/24     Medical Diagnosis:  Right wrist pain      OT Treatment Diagnosis:  Right wrist pain Plan of Treatment  Frequency/Duration:1x/week/8  weeks    Certification date from 01/29/24   To 03/25/24        See note for plan of treatment details and functional goals     Ronald House OT                         I CERTIFY THE NEED FOR THESE SERVICES FURNISHED UNDER        THIS PLAN OF TREATMENT AND WHILE UNDER MY CARE     (Physician attestation of this document indicates review and certification of the therapy plan).              Referring Provider:  Lev Gardner    Initial Assessment  See Epic Evaluation- 01/29/24

## 2024-02-12 ENCOUNTER — THERAPY VISIT (OUTPATIENT)
Dept: OCCUPATIONAL THERAPY | Facility: REHABILITATION | Age: 24
End: 2024-02-12
Payer: COMMERCIAL

## 2024-02-12 DIAGNOSIS — M25.531 RIGHT WRIST PAIN: Primary | ICD-10-CM

## 2024-02-12 PROCEDURE — 97530 THERAPEUTIC ACTIVITIES: CPT | Mod: GO | Performed by: OCCUPATIONAL THERAPIST

## 2024-02-12 PROCEDURE — 97110 THERAPEUTIC EXERCISES: CPT | Mod: GO | Performed by: OCCUPATIONAL THERAPIST

## 2024-02-26 ENCOUNTER — THERAPY VISIT (OUTPATIENT)
Dept: OCCUPATIONAL THERAPY | Facility: REHABILITATION | Age: 24
End: 2024-02-26
Payer: COMMERCIAL

## 2024-02-26 DIAGNOSIS — M25.531 RIGHT WRIST PAIN: Primary | ICD-10-CM

## 2024-02-26 PROCEDURE — 97110 THERAPEUTIC EXERCISES: CPT | Mod: GO | Performed by: OCCUPATIONAL THERAPIST

## 2024-03-04 ENCOUNTER — THERAPY VISIT (OUTPATIENT)
Dept: OCCUPATIONAL THERAPY | Facility: REHABILITATION | Age: 24
End: 2024-03-04
Payer: COMMERCIAL

## 2024-03-04 DIAGNOSIS — M25.531 RIGHT WRIST PAIN: Primary | ICD-10-CM

## 2024-03-04 PROCEDURE — 97110 THERAPEUTIC EXERCISES: CPT | Mod: GO | Performed by: OCCUPATIONAL THERAPIST

## 2024-03-04 PROCEDURE — 97530 THERAPEUTIC ACTIVITIES: CPT | Mod: GO | Performed by: OCCUPATIONAL THERAPIST

## 2024-04-01 ENCOUNTER — THERAPY VISIT (OUTPATIENT)
Dept: OCCUPATIONAL THERAPY | Facility: REHABILITATION | Age: 24
End: 2024-04-01
Payer: COMMERCIAL

## 2024-04-01 DIAGNOSIS — M25.531 RIGHT WRIST PAIN: Primary | ICD-10-CM

## 2024-04-01 PROCEDURE — 97110 THERAPEUTIC EXERCISES: CPT | Mod: GO | Performed by: OCCUPATIONAL THERAPIST

## 2024-04-01 NOTE — PROGRESS NOTES
04/01/24 0500   Appointment Info   Treating Provider Ronald House, KATHRIN, OTR/L, CLT   Total/Authorized Visits 8 (POC)   Visits Used 5   Medical Diagnosis Right wrist pain   OT Tx Diagnosis Right wrist pain   Quick Add  Certification   Progress Note/Certification   Start Of Care Date 01/29/24   Onset of Illness/Injury or Date of Surgery 01/15/24  (Referral)   Therapy Frequency 1x/month   Predicted Duration Additional 2 months   Certification date from 03/26/24   Certification date to 05/21/24   Progress Note Due Date 03/26/24   Progress Note Completed Date 04/01/24   Goals   OT Goals 1;2   OT Goal 1   Goal Identifier Goal I   Goal Description Patient will exhibit 56# or more of RUE  strength for increased activity tolerance with ADL, iADL and work.   Rationale In order to maximize safety and independence with performance of self-care activities;In order to maximize safety and independence with ADL/IADLs   Goal Progress Achieved   Target Date 03/25/24   Date Met 04/01/24   OT Goal 2   Goal Identifier Goal I   Goal Description Patient will complete routine work-related duties with little to no pain (0-1/10) and/or difficulty implementing adaptive equipment and activity modification strategies as needed.   Rationale In order to maximize safety and independence with performance of self-care activities;In order to maximize safety and independence with ADL/IADLs   Goal Progress New   Target Date 05/27/24   Subjective Report   Subjective Report My wrist is not that different, but still better. I should probably be exercising both sides. The vertical mouse has been really successful, too.   Objective Measures   Objective Measures Objective Measure 1;Objective Measure 2;Objective Measure 3;Objective Measure 4   Objective Measure 1   Objective Measure 0/10   Details Pain at rest   Objective Measure 2   Objective Measure 0/10   Details Pain with activity   Objective Measure 3   Objective Measure Negative Durkan's test  (right)   Details Observations and special tests   Objective Measure 4   Objective Measure 59# RUE, 62# LUE   Details BUE  strength   Treatment Interventions (OT)   Interventions Neuromuscular Re-education;Therapeutic Procedure/Exercise;Therapeutic Activity   Neuromuscular Re-education   Neuromuscular Re-ed Minutes (33767) 3   PTRx Neuro Re-ed 1 Median Nerve Mobility   PTRx Neuro Re-ed 1 - Details HEP, revised.  Now including tolerable shoulder abduction to intensify median nerve mobilization and tolerable arc.   Skilled Intervention For improved median nerve excursion at the carpal tunnel, reducing pain and paraesthesia with ADL, iADL and work.   Patient Response/Progress Tolerated well, patient demonstrated and verbalized understanding.   Therapeutic Procedure/Exercise   Therapeutic Procedure: strength, endurance, ROM, flexibillity minutes (02223) 24   Ther Proc 1 Objective measurements to assess growth towards goals.   Ther Proc 2 HEP as amended according to patient progress and current tolerances.  Current program is as articulated below.   PTRx Ther Proc 1 Finger Active Range of Motion Tendon Glides Fist Series   PTRx Ther Proc 1 - Details HEP   PTRx Ther Proc 2 Wrist Stabilization Wall Push Ups on Fingertips   PTRx Ther Proc 2 - Details HEP   PTRx Ther Proc 3 Wrist Stability DTM/Dart Throwers Motion with Theraband   PTRx Ther Proc 3 - Details HEP, new.  2 x 10 in clinic with education and assessment of tolerance.  Red Thera-Band.   PTRx Ther Proc 4 Thumb stabilization 1st Dorsal Interosseous with rubber band   PTRx Ther Proc 4 - Details HEP   PTRx Ther Proc 5 Thumb Strengthening Palmar Abduction   PTRx Ther Proc 5 - Details HEP   PTRx Ther Proc 6  Strengthening in 3 Planes   PTRx Ther Proc 6 - Details HEP   Skilled Intervention For improved soft tissue extensibilty/median nerve excursion reducing pain and paraesthesia with ADL, iADL.   Patient Response/Progress Tolerated well, patient demonstrated and  verbalized understanding.   Therapeutic Activity   Therapeutic Activity Minutes (57523) 3   Ther Act 1 Education regarding alternative keyboard ergonomics and assistive devices to improve activity tolerance with typing both at work and leisure.   PTRx Ther Act 1 Orthosis Wear and Care   PTRx Ther Act 1 - Details HEP, DC right, as needed left.   PTRx Ther Act 2 Carpal Tunnel Syndrome Prevention   PTRx Ther Act 2 - Details HEP   PTRx Ther Act 3 TENNIS ELBOW PREVENTION   PTRx Ther Act 3 - Details HEP   PTRx Ther Act 4 deQuervain Overview   PTRx Ther Act 4 - Details HEP   PTRx Ther Act 5 Body Mechanics - Full Squat   PTRx Ther Act 5 - Details HEP   Skilled Intervention For improved soft tissue extensibilty/median nerve excursion reducing pain and paraesthesia with ADL, iADL.   Patient Response/Progress Tolerated well, patient demonstrated and verbalized understanding.   Education   Learner/Method Patient;No Barriers to Learning;Pictures/Video;Demonstration;Reading;Listening   Plan   Home program Per PTRx, DC wrist cock-up (trial on left PRN). Implement protective strategies for the median nerve where able during the day.   Updates to plan of care Continue 1 time per month for additional 2 months.         [facility]                                                                                   OUTPATIENT OCCUPATIONAL THERAPY    PLAN OF TREATMENT FOR OUTPATIENT REHABILITATION   Patient's Last Name, First Name, Mick Jules YOB: 2000   Provider's Name   [facility]   Medical Record No.  [id number]     Onset Date: 01/15/24 (Referral) Start of Care Date: 01/29/24     Medical Diagnosis:  Right wrist pain      OT Treatment Diagnosis:  Right wrist pain Plan of Treatment  Frequency/Duration:1x/month/Additional 2 Months    Certification date from 03/26/24   To 05/21/24        See note for plan of treatment details and functional goals      Ronald House OT                         I CERTIFY THE NEED FOR THESE SERVICES FURNISHED UNDER        THIS PLAN OF TREATMENT AND WHILE UNDER MY CARE     (Physician attestation of this document indicates review and certification of the therapy plan).              Referring Provider:  Lev Gardner    Initial Assessment  See Epic Evaluation- 01/29/24        PLAN  Continue therapy per current plan of care.    Beginning/End Dates of Progress Note Reporting Period:  04/01/24 to 04/01/2024    Referring Provider:  Lev Gardner

## 2024-05-13 ENCOUNTER — THERAPY VISIT (OUTPATIENT)
Dept: OCCUPATIONAL THERAPY | Facility: REHABILITATION | Age: 24
End: 2024-05-13
Payer: COMMERCIAL

## 2024-05-13 DIAGNOSIS — M25.531 RIGHT WRIST PAIN: Primary | ICD-10-CM

## 2024-05-13 PROCEDURE — 97110 THERAPEUTIC EXERCISES: CPT | Mod: GO | Performed by: OCCUPATIONAL THERAPIST

## 2024-05-13 PROCEDURE — 97112 NEUROMUSCULAR REEDUCATION: CPT | Mod: GO | Performed by: OCCUPATIONAL THERAPIST

## 2024-06-10 ENCOUNTER — THERAPY VISIT (OUTPATIENT)
Dept: OCCUPATIONAL THERAPY | Facility: REHABILITATION | Age: 24
End: 2024-06-10
Payer: COMMERCIAL

## 2024-06-10 DIAGNOSIS — M25.531 RIGHT WRIST PAIN: Primary | ICD-10-CM

## 2024-06-10 PROCEDURE — 97110 THERAPEUTIC EXERCISES: CPT | Mod: GO | Performed by: OCCUPATIONAL THERAPIST

## 2024-06-10 NOTE — PROGRESS NOTES
06/10/24 0500   Appointment Info   Treating Provider KATHRIN Marquis, OTR/L, CHT   Total/Authorized Visits 9 (POC)   Visits Used 8   Medical Diagnosis Right wrist pain   OT Tx Diagnosis Right wrist pain   Other pertinent information Cert needed   Quick Add  Certification   Progress Note/Certification   Start Of Care Date 01/29/24   Onset of Illness/Injury or Date of Surgery 01/15/24  (Referral)   Therapy Frequency One-time follow up in 8 weeks for discharge   Predicted Duration One-time follow up in 8 weeks for discharge   Certification date from 06/11/24   Certification date to 07/12/24   Progress Note Due Date 07/12/24   Progress Note Completed Date 06/10/24   Goals   OT Goals 1;2   OT Goal 1   Goal Identifier Goal I   Goal Description Patient will exhibit 56# or more of RUE  strength for increased activity tolerance with ADL, iADL and work.   Rationale In order to maximize safety and independence with performance of self-care activities;In order to maximize safety and independence with ADL/IADLs   Goal Progress Achieved   Target Date 03/25/24   Date Met 04/01/24   OT Goal 2   Goal Identifier Goal I   Goal Description Patient will complete routine work-related duties with little to no pain (0-1/10) and/or difficulty implementing adaptive equipment and activity modification strategies as needed.   Rationale In order to maximize safety and independence with performance of self-care activities;In order to maximize safety and independence with ADL/IADLs   Goal Progress Goal extended, still with very intermittent discomfort during max loads at work.   Target Date 08/05/24   Subjective Report   Subjective Report My wrist feels better than ever. A little weak still in flexion, especially with heavier weight, but always getting better.   Objective Measures   Objective Measures Objective Measure 1;Objective Measure 2;Objective Measure 3;Objective Measure 4;Objective Measure 5   Objective Measure 1   Objective  Measure 0/10   Details Pain at rest   Objective Measure 2   Objective Measure 0-1/10   Details Pain with activity   Objective Measure 3   Objective Measure Mild, residual TTP to volar lunate   Details Observations   Objective Measure 4   Objective Measure 76# RUE, 70# LUE   Details BUE  strength   Treatment Interventions (OT)   Interventions Neuromuscular Re-education;Therapeutic Procedure/Exercise;Therapeutic Activity   Neuromuscular Re-education   PTRx Neuro Re-ed 1 Median Nerve Mobility   PTRx Neuro Re-ed 1 - Details HEP   PTRx Neuro Re-ed 2 Wrist Proprioception: Oscillation with Water in a Cup (Neutral Palm Up and Palm Down)   PTRx Neuro Re-ed 2 - Details HEP   Skilled Intervention For improved median nerve excursion at the carpal tunnel, reducing pain and paraesthesia with ADL, iADL and work.   Patient Response/Progress Tolerated well, patient demonstrated and verbalized understanding.   Therapeutic Procedure/Exercise   Therapeutic Procedure: strength, endurance, ROM, flexibillity minutes (40639) 25   Therapeutic Procedures Ther Proc 2;Ther Proc 3;Ther Proc 4;Ther Proc 5   Ther Proc 1 Objective measurements to assess growth towards goals.   Ther Proc 2 Patient is educated regarding anticipated course of recovery and necessitation of activity modification principles following soft tissue injury to the wrist, counseled that this can take on the order of 1 year.  Discussed timeframe for continuation of daily HEP, tapering to 3 to 4 days/week in line with the ACSM 8 weeks from now..   PTRx Ther Proc 1 Finger Active Range of Motion Tendon Glides Fist Series   PTRx Ther Proc 1 - Details HEP   PTRx Ther Proc 2 Wrist Stabilization Wall Push Ups on Fingertips   PTRx Ther Proc 2 - Details HEP   PTRx Ther Proc 3 Wrist Stability DTM/Dart Throwers Motion with Theraband   PTRx Ther Proc 3 - Details HEP, progressed.  Upgraded to green Thera-Band for use at home, 20 to 30 x 2 in clinic with education and assessment of  tolerance.  Also issued blue Thera-Band for if/when Green is asymptomatic, effortless and pain-free.   PTRx Ther Proc 4 Thumb stabilization 1st Dorsal Interosseous with rubber band   PTRx Ther Proc 4 - Details HEP   PTRx Ther Proc 5 Thumb Strengthening Palmar Abduction   PTRx Ther Proc 5 - Details HEP   PTRx Ther Proc 6  Strengthening in 3 Planes   PTRx Ther Proc 6 - Details HEP   PTRx Ther Proc 7 Wrist Stabilization Gyro Exerciser   PTRx Ther Proc 7 - Details HEP   Skilled Intervention For improved soft tissue extensibilty/median nerve excursion reducing pain and paraesthesia with ADL, iADL.   Patient Response/Progress Tolerated well, patient demonstrated and verbalized understanding.   Ther Proc 3 Isometric shoulder ER with Thera-Band   Ther Proc 3 - Details 3 x 5 in clinic with education and assessment of tolerance.  Added to HEP.   Ther Proc 4 Isotonic wrist strengthening (flexion)   Ther Proc 4 - Details 2 x 20 each with 3 pounds and 4 pounds, tolerated well and added to HEP.   Ther Proc 5 Isotonic wrist strengthening (extension)   Ther Proc 5 - Details 2 x 20 each with 3 pounds and 4 pounds, tolerated well and added to HEP.   Therapeutic Activity   PTRx Ther Act 1 Orthosis Wear and Care   PTRx Ther Act 1 - Details HEP, DC right, as needed left.   PTRx Ther Act 2 Carpal Tunnel Syndrome Prevention   PTRx Ther Act 2 - Details HEP   PTRx Ther Act 3 TENNIS ELBOW PREVENTION   PTRx Ther Act 3 - Details HEP   PTRx Ther Act 4 deQuervain Overview   PTRx Ther Act 4 - Details HEP   PTRx Ther Act 5 Body Mechanics - Full Squat   PTRx Ther Act 5 - Details HEP   Skilled Intervention For improved soft tissue extensibilty/median nerve excursion reducing pain and paraesthesia with ADL, iADL.   Patient Response/Progress Tolerated well, patient demonstrated and verbalized understanding.   Education   Learner/Method Patient;No Barriers to Learning;Pictures/Video;Demonstration;Reading;Listening   Plan   Home program Per PTRx, DC  wrist cock-up (left PRN). Implement protective strategies for the median nerve where able during the day.   Updates to plan of care 1 time follow-up in 8 weeks.         [facility]                                                                                   OUTPATIENT OCCUPATIONAL THERAPY    PLAN OF TREATMENT FOR OUTPATIENT REHABILITATION   Patient's Last Name, First Name, Mick Jules YOB: 2000   Provider's Name   [facility]   Medical Record No.  [id number]     Onset Date: 01/15/24 (Referral) Start of Care Date: 01/29/24     Medical Diagnosis:  Right wrist pain      OT Treatment Diagnosis:  Right wrist pain Plan of Treatment  Frequency/Duration:One-time follow up in 8 weeks for discharge/One-time follow up in 8 weeks for discharge    Certification date from 06/11/24   To 07/12/24        See note for plan of treatment details and functional goals     Ronald House OT                         I CERTIFY THE NEED FOR THESE SERVICES FURNISHED UNDER        THIS PLAN OF TREATMENT AND WHILE UNDER MY CARE     (Physician attestation of this document indicates review and certification of the therapy plan).              Referring Provider:  Lev Gardner    Initial Assessment  See Epic Evaluation- 01/29/24    PLAN  Continue therapy per current plan of care.    Beginning/End Dates of Progress Note Reporting Period:  06/10/24 to 06/10/2024    Referring Provider:  Lev Gardner

## 2024-07-11 ENCOUNTER — OFFICE VISIT (OUTPATIENT)
Dept: FAMILY MEDICINE | Facility: CLINIC | Age: 24
End: 2024-07-11
Payer: COMMERCIAL

## 2024-07-11 VITALS
OXYGEN SATURATION: 98 % | WEIGHT: 164 LBS | BODY MASS INDEX: 23.53 KG/M2 | DIASTOLIC BLOOD PRESSURE: 60 MMHG | SYSTOLIC BLOOD PRESSURE: 96 MMHG | TEMPERATURE: 97.5 F | HEART RATE: 59 BPM

## 2024-07-11 DIAGNOSIS — Z11.59 NEED FOR HEPATITIS C SCREENING TEST: ICD-10-CM

## 2024-07-11 DIAGNOSIS — R21 RASH: ICD-10-CM

## 2024-07-11 DIAGNOSIS — Z11.4 SCREENING FOR HIV (HUMAN IMMUNODEFICIENCY VIRUS): Primary | ICD-10-CM

## 2024-07-11 PROCEDURE — 99213 OFFICE O/P EST LOW 20 MIN: CPT | Performed by: FAMILY MEDICINE

## 2024-07-11 NOTE — PROGRESS NOTES
Assessment & Plan       (R21) Rash  Comment: Intermittent recurrent rash  Plan: Adult Dermatology  Referral        Referral to dermatology consultants, also told the patient to collect any further photographic evidence.                Subjective   iMck is a 24 year old, presenting for the following health issues:  Referral (Dermatologist )        7/11/2024     9:13 AM   Additional Questions   Roomed by Elyse ELIZONDO CMA     History of Present Illness       Reason for visit:  Skin problems  Symptom onset:  More than a month  Symptoms include:  Red splotchy patches on face that come and go  Symptom intensity:  Moderate  Symptom progression:  Staying the same  Had these symptoms before:  Yes  Has tried/received treatment for these symptoms:  No  What makes it worse:  Unknown  What makes it better:  Heavy use of moisturizers (though it wont always work)    He eats 0-1 servings of fruits and vegetables daily.He consumes 1 sweetened beverage(s) daily.He exercises with enough effort to increase his heart rate 30 to 60 minutes per day.  He exercises with enough effort to increase his heart rate 5 days per week.   He is taking medications regularly.     Patient comes in essentially for a skin issue and wanting a dermatology referral.  The patient for at least several years has intermittent recurrent outbreaks of a skin rash on his face, tends to be worse in the winter and worse with stress but he just had an outbreak in June.  His skin is clear right now but he has a picture on his phone where he gets various red lesions typically in the forehead cheek and chin area.  Sometimes around the ear but no other body parts are affected    Patient uses an over-the-counter CeraVe cream, he has not noticed any connection between what he eats, or other associations between side stress.  Worse in winter but he also has outbreaks and some in summer as indicated.    Patient did have an acne earlier in his life treated with  peroxide.                    Objective    BP 96/60   Pulse 59   Temp 97.5  F (36.4  C)   Wt 74.4 kg (164 lb)   SpO2 98%   BMI 23.53 kg/m    Body mass index is 23.53 kg/m .  Physical Exam               Signed Electronically by: Lev Gardner MD

## 2024-10-14 ENCOUNTER — THERAPY VISIT (OUTPATIENT)
Dept: OCCUPATIONAL THERAPY | Facility: REHABILITATION | Age: 24
End: 2024-10-14
Payer: COMMERCIAL

## 2024-10-14 DIAGNOSIS — M25.531 RIGHT WRIST PAIN: Primary | ICD-10-CM

## 2024-10-14 PROCEDURE — 97530 THERAPEUTIC ACTIVITIES: CPT | Mod: GO | Performed by: OCCUPATIONAL THERAPIST

## 2024-10-14 PROCEDURE — 97165 OT EVAL LOW COMPLEX 30 MIN: CPT | Mod: GO | Performed by: OCCUPATIONAL THERAPIST

## 2024-10-14 NOTE — PROGRESS NOTES
OCCUPATIONAL THERAPY EVALUATION  Type of Visit: Evaluation              Subjective      Presenting condition or subjective complaint: Return of right wrist pain  Date of onset: 01/15/24 (Referral)    Relevant medical history: -- (See EMR.)   Dates & types of surgery: See EMR.  Patient returns to therapy today for re-evaluation and treatment of right-sided wrist pain.  He is known to me here from a previous course of therapy for a similar problem, did endorse a fair amount of symptom relief following that treatment though has unfortunately experienced a recurrence of symptoms.  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  He denies great relief with wear of a wrist cock-up orthosis.  Home exercises helpful at times, the symptoms seem exacerbated by high-volume work.  He reveals today that he did sustain a wrist fracture to his right side earlier in his childhood.  Managed conservatively with casting.    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  Hobbies/Interests: [employer]s, will, paintingVideo games, board games, painting      Patient goals for therapy: Use a computer, write normally     Objective  ADDITIONAL HISTORY:  Right hand dominant  Patient reports symptoms of pain, stiffness/loss of  motion, weakness/loss of strength, numbness, and tingling   Transportation: drives  Currently working in normal job without restrictions     Functional Outcome Measure:   Upper Extremity Functional Index Score:  SCORE:   Column Totals: /80: 49   (A lower score indicates greater disability.)     PAIN:  Pain Level at Rest: 0/10  Pain Level with Use: 2-3/10  Pain Location: Mid dorsal wrist  Pain Quality: Aching  Pain Frequency: intermittent  Pain is Worst: daytime or nighttime  Pain is Exacerbated By: Lifting, typing, writing   Pain is Relieved By: rest and occasionally exercise  Pain Progression: Recently worsened     POSTURE: Forward Neck Posture      EDEMA:  None appreciable to palpation about the elbow, FA, wrist, thumb and digits.        SENSATION: Within normal limits in median, ulnar and radial distribution bilateral, per pt report      ROM:  AROM of the bilateral FA, thumb, wrist, and digits grossly WNL bilaterally.  Notable hyperextension of the bilateral elbows.      Tenderness: Pain level report on scale 0-10/10  WRIST PALPATION:  Date 10/14/2024   Side Bilateral   Ulna Styloid Negative bilateral   TFCC Negative bilateral   Distal Radius Negative bilateral   Radial Styloid Negative bilateral   DRUJ Negative bilateral   Volar Scaphoid Negative bilateral   Dorsal Scaphoid Negative bilateral   Volar Lunate Positive right, negative left    Dorsal Lunate Positive right, negative left     Central Dorsal Zone: (+ for hypermobile or - for hypomobile) Pain 0-10/10   10/14/2024   SIDE Bilateral    Finger Extension Test (SL--in wrist flex, resist long ext) Negative   Maxwell Test (SL) No overt shift, though perhaps mildly hypermobile right versus left.    Ballottement Scaphoid on Lunate Negative, though perhaps mildly hypermobile right versus left.      Ulnar Dorsal Zone: (+ for hypermobile or - for hypomobile) Pain 0-10/10   10/14/2024   SIDE Bilateral   Radiocarpal P/S (TFCC--stab f/a, rotate with some compression)  Negative    Ballottement II P/S (TFCC--stab hand/wrist, pt p/s) Positive, with crepitus.    TFCC load Test (UD, axially load wrist c volar/dorsal mvmt) Positive in pronation and supination, more severe pronated.    UMTDG test (TFCC--approximate the pisotriquetral and ulna) Positive right, negative left.    Lunotriquetral Sheer Test Positive right, negative left    Piano Key Sign (DRUJ) Negative   Volar RU Ligament Shift (DRUJ--stab ulna and wrist, push radius volarly) Negative   Dorsal RU Ligament Shift (DRUJ--stab ulna and wrist, push radius dorsally) Negative     RESISTED TESTING:  Negative right APL, EPB, FCR/FCU, ECRL/B, trace to ECU     STRENGTH:     Measured in pounds 1/29/2024 1/29/2024     Left Right   Trial 1 58# 49#   Trial 2       Trial 3       Average         Assessment & Plan   CLINICAL IMPRESSIONS  Medical Diagnosis: Right wrist pain    Treatment Diagnosis: Right wrist pain    Impression/Assessment: Pt is a 24 year old male presenting to Occupational Therapy due to right wrist pain.  The following significant findings have been identified: Impaired activity tolerance, Impaired coordination, Impaired ROM, Impaired strength, and Pain.  These identified deficits interfere with their ability to perform self care tasks, work tasks, recreational activities, household chores, driving , medication management,  yard work, care of others, and meal planning and preparation as compared to previous level of function.   Patient's limitations or Problem List includes: Pain, Decreased ROM/motion, Weakness, Decreased stability, Hypermobility, Hypomobility, Decreased , Decreased pinch, Decreased coordination, Decreased dexterity, and Tightness in musculature of the right elbow, wrist, and hand which interferes with the patient's ability to perform Self Care Tasks (dressing, eating, bathing, hygiene/toileting), Work Tasks, Sleep Patterns, Recreational Activities, Household Chores, and Driving  as compared to  previous level of function.    Clinical Decision Making (Complexity):  Assessment of Occupational Performance: 3-5 Performance Deficits  Occupational Performance Limitations: bathing/showering, toileting, dressing, hygiene and grooming, communication management, driving and community mobility, health management and maintenance, home establishment and management, meal preparation and cleanup, shopping, sleep, work, leisure activities, and social participation  Clinical Decision Making (Complexity): Low complexity    PLAN OF CARE  Treatment Interventions:  Therapeutic Exercise:  AROM, AAROM, PROM, Tendon Gliding, Blocking, Reverse Blocking, Place and Hold, Contract Relax, Extensor Tracking, Isotonics, Isometrics, and Stabilization  Neuromuscular re-education:  Nerve Gliding, Coordination/Dexterity, Kinesthetic Training, Proprioceptive Training, Posture, Kinesiotaping, Strain Counter Strain, Isometrics, and Stabilization  Manual Techniques:  Coordination/Dexterity, Joint mobilization, Friction massage, Myofascial release, and Manual edema mobilization  Orthotic Fabrication:  Static and Forearm based  Self Care:  Self Care Tasks, Ergonomic Considerations, and Work Tasks    Long Term Goals   OT Goal 2  Goal Identifier: Goal I  Goal Description: Patient will complete routine work-related duties with little to no pain (0-1/10) and/or difficulty implementing adaptive equipment, orthotics and activity modification strategies as needed.  Rationale: In order to maximize safety and independence with performance of self-care activities;In order to maximize safety and independence with ADL/IADLs  Goal Progress: Goal extended, still with very intermittent discomfort during max loads at work.  Target Date: 12/09/24      Frequency of Treatment: 1 time per week  Duration of Treatment: 8 weeks     Education Assessment: Learner/Method: Patient;No Barriers to Learning;Pictures/Video;Demonstration;Reading;Listening     Risks and benefits  of evaluation/treatment have been explained.   Patient/Family/caregiver agrees with Plan of Care.     Evaluation Time:    OT Alan, Low Complexity Minutes (46641): 15    Signing Clinician: CATHERINE Marquis [facility]                                                                                   OUTPATIENT OCCUPATIONAL THERAPY      PLAN OF TREATMENT FOR OUTPATIENT REHABILITATION   Patient's Last Name, First Name, Mick Jules YOB: 2000   Provider's Name   [facility]   Medical Record No.  [id number]     Onset Date: 01/15/24 (Referral) Start of Care Date: 10/14/24     Medical Diagnosis:  Right wrist pain      OT Treatment Diagnosis:  Right wrist pain Plan of Treatment  Frequency/Duration:1 time per week/8 weeks    Certification date from 10/14/24   To 12/09/24        See note for plan of treatment details and functional goals     Ronald House OT                         I CERTIFY THE NEED FOR THESE SERVICES FURNISHED UNDER        THIS PLAN OF TREATMENT AND WHILE UNDER MY CARE     (Physician attestation of this document indicates review and certification of the therapy plan).              Referring Provider:  Lev Gardner    Initial Assessment  See Epic Evaluation- 10/14/24

## 2024-10-25 ENCOUNTER — THERAPY VISIT (OUTPATIENT)
Dept: OCCUPATIONAL THERAPY | Facility: REHABILITATION | Age: 24
End: 2024-10-25
Payer: COMMERCIAL

## 2024-10-25 DIAGNOSIS — M25.531 RIGHT WRIST PAIN: Primary | ICD-10-CM

## 2024-10-25 PROCEDURE — 97530 THERAPEUTIC ACTIVITIES: CPT | Mod: GO | Performed by: OCCUPATIONAL THERAPIST

## 2024-10-25 PROCEDURE — 97110 THERAPEUTIC EXERCISES: CPT | Mod: GO | Performed by: OCCUPATIONAL THERAPIST

## 2024-11-01 ENCOUNTER — THERAPY VISIT (OUTPATIENT)
Dept: OCCUPATIONAL THERAPY | Facility: REHABILITATION | Age: 24
End: 2024-11-01
Payer: COMMERCIAL

## 2024-11-01 DIAGNOSIS — M25.531 RIGHT WRIST PAIN: Primary | ICD-10-CM

## 2024-11-01 PROCEDURE — 97110 THERAPEUTIC EXERCISES: CPT | Mod: GO | Performed by: OCCUPATIONAL THERAPIST

## 2024-11-01 PROCEDURE — 97530 THERAPEUTIC ACTIVITIES: CPT | Mod: GO | Performed by: OCCUPATIONAL THERAPIST

## 2024-11-01 NOTE — PROGRESS NOTES
11/01/24 0500   Appointment Info   Treating Provider Ronald House, KATHRIN, OTR/L, CHT   Total/Authorized Visits 8 (POC)   Visits Used 3   Medical Diagnosis Right wrist pain   OT Tx Diagnosis Right wrist pain   Progress Note/Certification   Start Of Care Date 10/14/24   Onset of Illness/Injury or Date of Surgery 01/15/24  (Referral)   Therapy Frequency 1 time per week   Predicted Duration 8 weeks   Certification date from 10/14/24   Certification date to 12/09/24   Progress Note Due Date 12/09/24   Progress Note Completed Date 10/14/24   Goals   OT Goals 1;2   OT Goal 2   Goal Identifier Goal I   Goal Description Patient will complete routine work-related duties with little to no pain (0-1/10) and/or difficulty implementing adaptive equipment, orthotics and activity modification strategies as needed.   Rationale In order to maximize safety and independence with performance of self-care activities;In order to maximize safety and independence with ADL/IADLs   Goal Progress Goal extended, still with very intermittent discomfort during max loads at work.   Target Date 12/09/24   Subjective Report   Subjective Report I woke up yesterday feeling ok, but it got worse with schoolwork, handwriting and work. Yesterday it was in the little and ring fingers.   Objective Measure 3   Objective Measure To the RUE: Elbow flexion test is negative over 30 seconds, direct compression over Guyon's canal produces paresthesia to the volar aspect of the index, middle and ring fingers per patient report.  Patient complains of paresthesias in the same distribution with direct pressure over the carpal tunnel.  Tinel's of the cubital tunnel is negative.   Details Observations and special tests   Neuromuscular Re-education   Skilled Intervention For improved median nerve excursion through the carpal tunnel, mitigating pain and paresthesia with ADL, IADL, work and sleep.   Patient Response/Progress Tolerated well, patient demonstrated and  verbalized understanding.   Therapeutic Procedure/Exercise   Therapeutic Procedure: strength, endurance, ROM, flexibillity minutes (70624) 10   Ther Proc 1 Objective measurements to assess growth towards goals, facilitate differential and amendments to HEP.   PTRx Ther Proc 1 Finger Active Range of Motion Tendon Glides Fist Series   PTRx Ther Proc 1 - Details HEP, hold.   PTRx Ther Proc 2 Intrinsics Passive Range of Motion Lumbrical Stretch 2   PTRx Ther Proc 2 - Details HEP, hold.   PTRx Ther Proc 3 Median Nerve Mobility   PTRx Ther Proc 3 - Details HEP, reviewed. In an arc that produces mild tension without worsening of paresthesia. 2 x 10 in clinic with education and assessment of tolerance.   Skilled Intervention For improved soft tissue extensibilty/median nerve excursion reducing pain and paraesthesia with ADL, iADL.   Patient Response/Progress Tolerated well, patient demonstrated and verbalized understanding.   Therapeutic Activity   Therapeutic Activity Minutes (93129) 16   Ther Act 1 Reviewed differential today as well as provocative positioning and activities.  Given the lack of discrete cause, however exacerbation with prolonged computer/keyboard use, ultimately recommend cessation from elective computer usage for several days.  Discontinue wrist orthotic, and discontinue HEP through the weekend pending patient progress.  If there is improvement, continue to refrain.  If there is exacerbation, reach out via MyChart to coordinate resumption of at least a portion of his HEP.  Discussed modification to nighttime sleeping position in order to relax the median and ulnar nerves at known compression sites.  If able, side sleeping is preferred with the elbow, wrist and forearm supported, elbow at no more than 90 degrees flexion, wrist in neutral, digits and extension.   PTRx Ther Act 1 Orthosis Wear and Care   PTRx Ther Act 1 - Details HEP, discontinue wrist cock up until reassessment.   PTRx Ther Act 2 Carpal  Tunnel Syndrome Prevention   PTRx Ther Act 2 - Details HEP   PTRx Ther Act 3 TENNIS ELBOW PREVENTION   PTRx Ther Act 3 - Details HEP   Skilled Intervention For improved soft tissue extensibilty/median nerve excursion reducing pain and paraesthesia with ADL, iADL.   Patient Response/Progress Tolerated well, patient demonstrated and verbalized understanding.   Education   Learner/Method Patient;Pictures/Video;Demonstration;Reading;Listening   Education Comments Patient with inconsistent reporting of symptoms complicates differential and progression of therapy.  His complaints do not necessarily coincide with physiologic expectations during provocative test.   Plan   Home program Greatly reduce elective computer use, refrain from exercises and orthotic wear.  Seek evaluation with sports medicine orthopedic hand surgery.   Updates to plan of care Holding therapy until evaluation by advanced provider.   Total Session Time   Timed Code Treatment Minutes 26   Total Treatment Time (sum of timed and untimed services) 26       PLAN  See above.     Beginning/End Dates of Progress Note Reporting Period:  10/14/24 to 11/01/2024    Referring Provider:  Lev Gardner

## 2024-11-04 NOTE — PROGRESS NOTES
Orthopaedic Surgery Hand and Upper Extremity Clinic H&P Note:  Date: Nov 8, 2024     Patient Name: Mick Leos  MRN: [id number]    Consult requested by: No ref. provider found    Chief Complaint: Right upper extremity pain and tingling    Dominant Hand: right  Occupation:  [employer]    HPI:  Mr. Mick Leos is a 24 year old male right hand dominant who presents with right wrist pain that has been going for about 10 month, no injury. Pain is constant but gets worse with heavy lifting or fine motor skills and pressure. Patient has tried OT which has helped for about 3 months. He has tried bracing but causes hand to go numb.  He will occasionally take ibuprofen as needed.  His complaints are variable and that he has both numbness/pins-and-needles and also sometimes pain in the proximal forearm.  He occasionally has symptoms similar to this in the left upper extremity but this is much less common than the right upper extremity.     He definitely notices that the symptoms are worse with using it especially when at work.  He has had to leave work several times due to the pain    PMH  Diabetes:  no   Thyroid Problems:  no   Smoking Y/N:  non smoker.      PAST MEDICAL HISTORY:  No past medical history on file.    PAST SURGICAL HISTORY:  Past Surgical History:   Procedure Laterality Date    NO PAST SURGERIES         MEDICATIONS:  Current Outpatient Medications   Medication Sig Dispense Refill    ibuprofen (ADVIL,MOTRIN) 600 MG tablet [IBUPROFEN (ADVIL,MOTRIN) 600 MG TABLET]  (Patient not taking: Reported on 1/2/2024)       No current facility-administered medications for this visit.     ALLERGIES:   No Known Allergies    FAMILY HISTORY:  No pertinent family history    SOCIAL HISTORY:  Social History     Tobacco Use    Smoking status: Never    Smokeless tobacco: Never     The patient's past medical, family, and social history was reviewed and confirmed.    REVIEW OF SYMPTOMS:    General: Negative  "  Eyes: Negative   Ear, Nose and Throat: Negative   Respiratory: Negative   Cardiovascular: Negative   Gastrointestinal: Negative   Genito-urinary: Negative   Musculoskeletal: see HPI  Neurological: Negative   Psychological: Negative  HEME: Negative   ENDO: Negative   SKIN: Negative    VITALS:  Vitals:    11/08/24 1323   Weight: 74.8 kg (165 lb)   Height: 1.803 m (5' 11\")     EXAM:  General: NAD, A&Ox3  HEENT: NC/AT  CV: RRR by peripheral pulse  Pulmonary: Non-labored breathing on RA    Right Upper Extremity:    Inspection:  There is no evidence of open wounds.   There is no evidence of erythema or infection  There is no appreciable swelling or synovitis.  There is no appreciable intrinsic atrophy  There is well maintained thenar musculature    Palpation:  There is no palpable fluctuance  No specific areas of tenderness to palpation    Motor:  Intact EPL, FPL, HI    Sensory:  He says that he has slightly diminished sensation in the ulnar aspect of the small finger compared to contralateral.    Examination Maneuvers:  Tinel's test is positive at the cubital tunnel, negative at the carpal tunnel.  Weakly positive along the course the median nerve and the antebrachium  Compression testing is negative at the cubital tunnel at 20 seconds  Phalan's test is negative         IMAGING:    AP, oblique, lateral imaging of the right wrist does not demonstrate any osseous abnormalities.  I have personally reviewed the above images and labs.       IMPRESSION AND RECOMMENDATIONS:  Right hand and forearm pain and paresthesias    Mick Leos is a 24-year-old male who has nonspecific complaints of pain in the right forearm as well as intermittent numbness/pins-and-needles in the right upper extremity.  I explained to him that does appear that he has symptoms that are consistent with a neural pathology.  He does have a Tinel's along the median nerve in the forearm as well as Tinel's of the elbow and his symptoms are consistent " with numbness and tingling in those distributions.  However, his provocative tests are otherwise normal and it does not explain the pain that he is feeling.  I discussed with him that in this situations of vague neuropathic pain in the upper extremity a diagnosis of neurogenic thoracic outlet can symptoms be considered.  In order to evaluate for this or other small fiber nerve etiologies we decided to perform a nerve conduction study/EMG of the right upper extremity which will hopefully be enlightening.  He will return after the nerve test    Judson Polo MD    Hand, Upper Extremity & Microvascular Surgery  Department of Orthopaedic Surgery  [facility]

## 2024-11-08 ENCOUNTER — ANCILLARY PROCEDURE (OUTPATIENT)
Dept: GENERAL RADIOLOGY | Facility: CLINIC | Age: 24
End: 2024-11-08
Attending: STUDENT IN AN ORGANIZED HEALTH CARE EDUCATION/TRAINING PROGRAM
Payer: COMMERCIAL

## 2024-11-08 ENCOUNTER — OFFICE VISIT (OUTPATIENT)
Dept: ORTHOPEDICS | Facility: CLINIC | Age: 24
End: 2024-11-08
Payer: COMMERCIAL

## 2024-11-08 VITALS — HEIGHT: 71 IN | WEIGHT: 165 LBS | BODY MASS INDEX: 23.1 KG/M2

## 2024-11-08 DIAGNOSIS — M25.531 RIGHT WRIST PAIN: Primary | ICD-10-CM

## 2024-11-08 DIAGNOSIS — M25.531 RIGHT WRIST PAIN: ICD-10-CM

## 2024-11-08 PROCEDURE — 99204 OFFICE O/P NEW MOD 45 MIN: CPT | Performed by: STUDENT IN AN ORGANIZED HEALTH CARE EDUCATION/TRAINING PROGRAM

## 2024-11-08 PROCEDURE — 73110 X-RAY EXAM OF WRIST: CPT | Mod: TC | Performed by: RADIOLOGY

## 2024-11-08 NOTE — LETTER
11/8/2024      Mick Leos  [address] [place] [address]      Dear Colleague,    Thank you for referring your patient, Mick Leos, to the [facility]. Please see a copy of my visit note below.    Orthopaedic Surgery Hand and Upper Extremity Clinic H&P Note:  Date: Nov 8, 2024     Patient Name: Mick Leos  MRN: [id number]    Consult requested by: No ref. provider found    Chief Complaint: Right upper extremity pain and tingling    Dominant Hand: right  Occupation:  [employer]    HPI:  Mr. Mick Leos is a 24 year old male right hand dominant who presents with right wrist pain that has been going for about 10 month, no injury. Pain is constant but gets worse with heavy lifting or fine motor skills and pressure. Patient has tried OT which has helped for about 3 months. He has tried bracing but causes hand to go numb.  He will occasionally take ibuprofen as needed.  His complaints are variable and that he has both numbness/pins-and-needles and also sometimes pain in the proximal forearm.  He occasionally has symptoms similar to this in the left upper extremity but this is much less common than the right upper extremity.     He definitely notices that the symptoms are worse with using it especially when at work.  He has had to leave work several times due to the pain    PMH  Diabetes:  no   Thyroid Problems:  no   Smoking Y/N:  non smoker.      PAST MEDICAL HISTORY:  No past medical history on file.    PAST SURGICAL HISTORY:  Past Surgical History:   Procedure Laterality Date     NO PAST SURGERIES         MEDICATIONS:  Current Outpatient Medications   Medication Sig Dispense Refill     ibuprofen (ADVIL,MOTRIN) 600 MG tablet [IBUPROFEN (ADVIL,MOTRIN) 600 MG TABLET]  (Patient not taking: Reported on 1/2/2024)       No current facility-administered medications for this visit.     ALLERGIES:   No Known Allergies    FAMILY HISTORY:  No pertinent  "family history    SOCIAL HISTORY:  Social History     Tobacco Use     Smoking status: Never     Smokeless tobacco: Never     The patient's past medical, family, and social history was reviewed and confirmed.    REVIEW OF SYMPTOMS:    General: Negative   Eyes: Negative   Ear, Nose and Throat: Negative   Respiratory: Negative   Cardiovascular: Negative   Gastrointestinal: Negative   Genito-urinary: Negative   Musculoskeletal: see HPI  Neurological: Negative   Psychological: Negative  HEME: Negative   ENDO: Negative   SKIN: Negative    VITALS:  Vitals:    11/08/24 1323   Weight: 74.8 kg (165 lb)   Height: 1.803 m (5' 11\")     EXAM:  General: NAD, A&Ox3  HEENT: NC/AT  CV: RRR by peripheral pulse  Pulmonary: Non-labored breathing on RA    Right Upper Extremity:    Inspection:  There is no evidence of open wounds.   There is no evidence of erythema or infection  There is no appreciable swelling or synovitis.  There is no appreciable intrinsic atrophy  There is well maintained thenar musculature    Palpation:  There is no palpable fluctuance  No specific areas of tenderness to palpation    Motor:  Intact EPL, FPL, HI    Sensory:  He says that he has slightly diminished sensation in the ulnar aspect of the small finger compared to contralateral.    Examination Maneuvers:  Tinel's test is positive at the cubital tunnel, negative at the carpal tunnel.  Weakly positive along the course the median nerve and the antebrachium  Compression testing is negative at the cubital tunnel at 20 seconds  Phalan's test is negative         IMAGING:    AP, oblique, lateral imaging of the right wrist does not demonstrate any osseous abnormalities.  I have personally reviewed the above images and labs.       IMPRESSION AND RECOMMENDATIONS:  Right hand and forearm pain and paresthesias    Mick Leos is a 24-year-old male who has nonspecific complaints of pain in the right forearm as well as intermittent numbness/pins-and-needles in the " right upper extremity.  I explained to him that does appear that he has symptoms that are consistent with a neural pathology.  He does have a Tinel's along the median nerve in the forearm as well as Tinel's of the elbow and his symptoms are consistent with numbness and tingling in those distributions.  However, his provocative tests are otherwise normal and it does not explain the pain that he is feeling.  I discussed with him that in this situations of vague neuropathic pain in the upper extremity a diagnosis of neurogenic thoracic outlet can symptoms be considered.  In order to evaluate for this or other small fiber nerve etiologies we decided to perform a nerve conduction study/EMG of the right upper extremity which will hopefully be enlightening.  He will return after the nerve test    Judson Polo MD    Hand, Upper Extremity & Microvascular Surgery  Department of Orthopaedic Surgery  [facility]           Again, thank you for allowing me to participate in the care of your patient.        Sincerely,        Judson Polo MD

## 2025-01-18 ENCOUNTER — HEALTH MAINTENANCE LETTER (OUTPATIENT)
Age: 25
End: 2025-01-18

## 2025-02-19 ENCOUNTER — OFFICE VISIT (OUTPATIENT)
Dept: NEUROLOGY | Facility: CLINIC | Age: 25
End: 2025-02-19
Attending: STUDENT IN AN ORGANIZED HEALTH CARE EDUCATION/TRAINING PROGRAM
Payer: COMMERCIAL

## 2025-02-19 DIAGNOSIS — M25.531 RIGHT WRIST PAIN: ICD-10-CM

## 2025-02-19 DIAGNOSIS — M79.601 PAIN OF RIGHT UPPER EXTREMITY: Primary | ICD-10-CM

## 2025-02-19 NOTE — PROCEDURES
[facility]  Electrodiagnostic Laboratory                 Department of Neurology                                                                                                         Test Date:  2025    Patient: Mick Leos : 2000 Physician: Alexa Quiñones MD   Sex: Male AGE: 24 year Ref Phys: Dr Polo   ID#: [id number]   Technician: Kristy Behling     History and Examination:  Dr. Polo referred the patient for right upper extremity EMG to evaluate intermittent right forearm numbness/paresthesias.     Techniques:  Motor conduction studies were done with surface recording electrodes. Sensory conduction studies were performed with surface electrodes, unless indicated otherwise by (n), designating the use of subdermal recording electrodes. Temperature was monitored and recorded throughout the study. Upper extremities were maintained at a temperature of 32 degrees Centigrade or higher and Lower extremities were maintained at a temperature of 31 degrees Centigrade or higher.  EMG was done with a concentric needle electrode.     Results  Motor nerve studies:  Right median motor study reveals normal distal latency, amplitude and conduction velocity  Right ulnar motor study reveals normal distal latency, amplitude and conduction velocity    Sensory nerve studies:  Antebrachial cutaneous sensory studies were performed bilaterally and compared.  No significant differences between sides was seen  Antidromic right median sensory study is within normal limits  Palmar studies of the median nerve with performed and there is no significant difference between peak latencies of the median and ulnar nerve  Right radial sensory study is normal  Antidromic right ulnar sensory study is normal    Right median F wave is within normal limits    Needle examination:  Needle examination of the right upper extremity reveals increased insertional activity in the deltoid biceps, pronator teres  and C6 cervical paraspinals.  Sustained fibrillation potentials were seen only in the biceps.  With activation the right deltoid motor unit potentials were higher in amplitude but not duration and there was no abnormality in recruitment pattern.  Right biceps had a higher percentage of polyphasic motor unit potentials but otherwise appeared normal with recruitment in size.  Pronator teres were increased in duration with increased polyphasia.  Cervical paraspinal muscles at C6 also had increased duration and polyphasia.    Interpretation:    This is an abnormal EMG.  Findings are most suggestive of a mild right sided C6 radiculopathy although some C7 nerve root involvement may also be present.  No evidence of a median nerve injury nor evidence of a brachial plexopathy was seen.        ___________________________  Alexa Quiñones MD        Nerve Conduction Studies  Motor Sites    Latency Neg. Amp Neg. Amp Diff Segment Distance Velocity Neg. Dur Neg Area Diff Temperature Comment   Site (ms) Norm (mV) Norm (%)  cm m/s Norm (ms) (%) ( C)    Right Median (APB) Motor   Wrist 3.4  < 4.4 8.3  > 5.0  Wrist-APB 8   5.7  30.4    Elbow 7.5 - 8.1  > 5.0 -2 Elbow-Wrist 25 61  > 48 5.8 -3 30.6    Right Ulnar (ADM) Motor   Wrist 3.0  < 3.5 11.3  > 5.0  Wrist-ADM 8   5.1  31.1    Below Elbow 6.4 - 10.3 - -9 Below Elbow-Wrist 21 62  > 48 5.2 -3 31.1    Above Elbow 7.9 - 9.8 - -5 Above Elbow-Below Elbow 9 60  > 48 5.8 4 31.1      Sensory Sites      Onset Lat Peak Lat Amp (O-P) Amp (P-P) Segment Distance Velocity Temperature Comment   Site ms (ms)  V Norm ( V)  cm m/s Norm ( C)    Left Medial Antebrachial Cutaneous Sensory   Elbow-Med Forearm 1.55 2.2 14 - 12 Elbow-Med Forearm 10 65 - 30.7    Right Medial Antebrachial Cutaneous Sensory   Elbow-Med Forearm 1.40 1.98 24 - 20 Elbow-Med Forearm 10 71 - 31.2    Right Median Sensory   Wrist-Dig II 2.3 3.2 61  > 10 97 Wrist-Dig II 14 61  > 48 29.8    Right Median-Ulnar Palmar Sensory         Median   Palm-Wrist 1.43 1.98 104 - 129 Palm-Wrist 8 56 - 27.9         Ulnar   Palm-Wrist 1.33 1.85 40 - 55 Palm-Wrist 8 60 - 28.8    Right Radial Sensory   Forearm-Wrist 1.93 2.6 46  > 15 59 Forearm-Wrist 10 52 - 31.1    Right Ulnar Sensory   Wrist-Dig V 2.1 2.8 60  > 8 124 Wrist-Dig V 12.5 60  > 48 30.2      Inter-Nerve Comparisons     Nerve 1 Value 1 Nerve 2 Value 2 Parameter Result Normal   Sensory Sites   R Median Palm-Wrist 1.98 ms R Ulnar Palm-Wrist 1.85 ms Peak Lat Diff 0.13 ms <0.40       Electromyography   Side Muscle Ins Act Fibs/PSW Fasc HF Amp Dur Poly Recrt Int Pat   Right Deltoid Incr None Nml 0 1+ Nml 0 Nml Nml   Right Triceps Nml None Nml 0 Nml Nml 0 Nml Nml   Right Biceps Incr 1+ Nml 0 Nml Nml 1+ Nml Nml   Right Pronator Teres Incr None Nml 0 Nml 1+ 1+ Nml Nml   Right FDI Nml None Nml 0 Nml Nml 0 Nml Nml   Right C6 PSP Incr None Nml 0 Nml 1+ 1+ Nml Nml       Waveforms / Images:     Motor         Sensory

## 2025-02-19 NOTE — LETTER
2025      Mick Leos  [address]  [facility] [address]      Dear Colleague,    Thank you for referring your patient, Mick Leos, to the [facility]. Please see a copy of my visit note below.                        [facility]  Electrodiagnostic Laboratory                 Department of Neurology                                                                                                         Test Date:  2025    Patient: Mick Leos : 2000 Physician: Alexa Quiñones MD   Sex: Male AGE: 24 year Ref Phys: Dr Polo   ID#: [id number]   Technician: Kristy Behling     History and Examination:  Dr. Polo referred the patient for right upper extremity EMG to evaluate intermittent right forearm numbness/paresthesias.     Techniques:  Motor conduction studies were done with surface recording electrodes. Sensory conduction studies were performed with surface electrodes, unless indicated otherwise by (n), designating the use of subdermal recording electrodes. Temperature was monitored and recorded throughout the study. Upper extremities were maintained at a temperature of 32 degrees Centigrade or higher and Lower extremities were maintained at a temperature of 31 degrees Centigrade or higher.  EMG was done with a concentric needle electrode.     Results  Motor nerve studies:  Right median motor study reveals normal distal latency, amplitude and conduction velocity  Right ulnar motor study reveals normal distal latency, amplitude and conduction velocity    Sensory nerve studies:  Antebrachial cutaneous sensory studies were performed bilaterally and compared.  No significant differences between sides was seen  Antidromic right median sensory study is within normal limits  Palmar studies of the median nerve with performed and there is no significant difference between peak latencies of the median and ulnar nerve  Right radial  sensory study is normal  Antidromic right ulnar sensory study is normal    Right median F wave is within normal limits    Needle examination:  Needle examination of the right upper extremity reveals increased insertional activity in the deltoid biceps, pronator teres and C6 cervical paraspinals.  Sustained fibrillation potentials were seen only in the biceps.  With activation the right deltoid motor unit potentials were higher in amplitude but not duration and there was no abnormality in recruitment pattern.  Right biceps had a higher percentage of polyphasic motor unit potentials but otherwise appeared normal with recruitment in size.  Pronator teres were increased in duration with increased polyphasia.  Cervical paraspinal muscles at C6 also had increased duration and polyphasia.    Interpretation:    This is an abnormal EMG.  Findings are most suggestive of a mild right sided C6 radiculopathy although some C7 nerve root involvement may also be present.  No evidence of a median nerve injury nor evidence of a brachial plexopathy was seen.        ___________________________  Alexa Quiñones MD        Nerve Conduction Studies  Motor Sites    Latency Neg. Amp Neg. Amp Diff Segment Distance Velocity Neg. Dur Neg Area Diff Temperature Comment   Site (ms) Norm (mV) Norm (%)  cm m/s Norm (ms) (%) ( C)    Right Median (APB) Motor   Wrist 3.4  < 4.4 8.3  > 5.0  Wrist-APB 8   5.7  30.4    Elbow 7.5 - 8.1  > 5.0 -2 Elbow-Wrist 25 61  > 48 5.8 -3 30.6    Right Ulnar (ADM) Motor   Wrist 3.0  < 3.5 11.3  > 5.0  Wrist-ADM 8   5.1  31.1    Below Elbow 6.4 - 10.3 - -9 Below Elbow-Wrist 21 62  > 48 5.2 -3 31.1    Above Elbow 7.9 - 9.8 - -5 Above Elbow-Below Elbow 9 60  > 48 5.8 4 31.1      Sensory Sites      Onset Lat Peak Lat Amp (O-P) Amp (P-P) Segment Distance Velocity Temperature Comment   Site ms (ms)  V Norm ( V)  cm m/s Norm ( C)    Left Medial Antebrachial Cutaneous Sensory   Elbow-Med Forearm 1.55 2.2 14 - 12 Elbow-Med  Forearm 10 65 - 30.7    Right Medial Antebrachial Cutaneous Sensory   Elbow-Med Forearm 1.40 1.98 24 - 20 Elbow-Med Forearm 10 71 - 31.2    Right Median Sensory   Wrist-Dig II 2.3 3.2 61  > 10 97 Wrist-Dig II 14 61  > 48 29.8    Right Median-Ulnar Palmar Sensory        Median   Palm-Wrist 1.43 1.98 104 - 129 Palm-Wrist 8 56 - 27.9         Ulnar   Palm-Wrist 1.33 1.85 40 - 55 Palm-Wrist 8 60 - 28.8    Right Radial Sensory   Forearm-Wrist 1.93 2.6 46  > 15 59 Forearm-Wrist 10 52 - 31.1    Right Ulnar Sensory   Wrist-Dig V 2.1 2.8 60  > 8 124 Wrist-Dig V 12.5 60  > 48 30.2      Inter-Nerve Comparisons     Nerve 1 Value 1 Nerve 2 Value 2 Parameter Result Normal   Sensory Sites   R Median Palm-Wrist 1.98 ms R Ulnar Palm-Wrist 1.85 ms Peak Lat Diff 0.13 ms <0.40       Electromyography   Side Muscle Ins Act Fibs/PSW Fasc HF Amp Dur Poly Recrt Int Pat   Right Deltoid Incr None Nml 0 1+ Nml 0 Nml Nml   Right Triceps Nml None Nml 0 Nml Nml 0 Nml Nml   Right Biceps Incr 1+ Nml 0 Nml Nml 1+ Nml Nml   Right Pronator Teres Incr None Nml 0 Nml 1+ 1+ Nml Nml   Right FDI Nml None Nml 0 Nml Nml 0 Nml Nml   Right C6 PSP Incr None Nml 0 Nml 1+ 1+ Nml Nml       Waveforms / Images:     Motor         Sensory                              Again, thank you for allowing me to participate in the care of your patient.        Sincerely,        Alexa Quiñones MD    Electronically signed

## 2025-02-19 NOTE — PROGRESS NOTES
[facility]  Electrodiagnostic Laboratory                 Department of Neurology                                                                                                         Test Date:  2025    Patient: Mick Leos : 2000 Physician: Alexa Quiñones MD   Sex: Male AGE: 24 year Ref Phys: Dr Polo   ID#: [id number]   Technician: Kristy Behling     History and Examination:  Dr. Polo referred the patient for right upper extremity EMG to evaluate intermittent right forearm numbness/paresthesias.     Techniques:  Motor conduction studies were done with surface recording electrodes. Sensory conduction studies were performed with surface electrodes, unless indicated otherwise by (n), designating the use of subdermal recording electrodes. Temperature was monitored and recorded throughout the study. Upper extremities were maintained at a temperature of 32 degrees Centigrade or higher and Lower extremities were maintained at a temperature of 31 degrees Centigrade or higher.  EMG was done with a concentric needle electrode.     Results  Motor nerve studies:  Right median motor study reveals normal distal latency, amplitude and conduction velocity  Right ulnar motor study reveals normal distal latency, amplitude and conduction velocity    Sensory nerve studies:  Antebrachial cutaneous sensory studies were performed bilaterally and compared.  No significant differences between sides was seen  Antidromic right median sensory study is within normal limits  Palmar studies of the median nerve with performed and there is no significant difference between peak latencies of the median and ulnar nerve  Right radial sensory study is normal  Antidromic right ulnar sensory study is normal    Right median F wave is within normal limits    Needle examination:  Needle examination of the right upper extremity reveals increased insertional activity in the deltoid biceps, pronator teres  and C6 cervical paraspinals.  Sustained fibrillation potentials were seen only in the biceps.  With activation the right deltoid motor unit potentials were higher in amplitude but not duration and there was no abnormality in recruitment pattern.  Right biceps had a higher percentage of polyphasic motor unit potentials but otherwise appeared normal with recruitment in size.  Pronator teres were increased in duration with increased polyphasia.  Cervical paraspinal muscles at C6 also had increased duration and polyphasia.    Interpretation:    This is an abnormal EMG.  Findings are most suggestive of a mild right sided C6 radiculopathy although some C7 nerve root involvement may also be present.  No evidence of a median nerve injury nor evidence of a brachial plexopathy was seen.        ___________________________  Alexa Quiñones MD        Nerve Conduction Studies  Motor Sites    Latency Neg. Amp Neg. Amp Diff Segment Distance Velocity Neg. Dur Neg Area Diff Temperature Comment   Site (ms) Norm (mV) Norm (%)  cm m/s Norm (ms) (%) ( C)    Right Median (APB) Motor   Wrist 3.4  < 4.4 8.3  > 5.0  Wrist-APB 8   5.7  30.4    Elbow 7.5 - 8.1  > 5.0 -2 Elbow-Wrist 25 61  > 48 5.8 -3 30.6    Right Ulnar (ADM) Motor   Wrist 3.0  < 3.5 11.3  > 5.0  Wrist-ADM 8   5.1  31.1    Below Elbow 6.4 - 10.3 - -9 Below Elbow-Wrist 21 62  > 48 5.2 -3 31.1    Above Elbow 7.9 - 9.8 - -5 Above Elbow-Below Elbow 9 60  > 48 5.8 4 31.1      Sensory Sites      Onset Lat Peak Lat Amp (O-P) Amp (P-P) Segment Distance Velocity Temperature Comment   Site ms (ms)  V Norm ( V)  cm m/s Norm ( C)    Left Medial Antebrachial Cutaneous Sensory   Elbow-Med Forearm 1.55 2.2 14 - 12 Elbow-Med Forearm 10 65 - 30.7    Right Medial Antebrachial Cutaneous Sensory   Elbow-Med Forearm 1.40 1.98 24 - 20 Elbow-Med Forearm 10 71 - 31.2    Right Median Sensory   Wrist-Dig II 2.3 3.2 61  > 10 97 Wrist-Dig II 14 61  > 48 29.8    Right Median-Ulnar Palmar Sensory         Median   Palm-Wrist 1.43 1.98 104 - 129 Palm-Wrist 8 56 - 27.9         Ulnar   Palm-Wrist 1.33 1.85 40 - 55 Palm-Wrist 8 60 - 28.8    Right Radial Sensory   Forearm-Wrist 1.93 2.6 46  > 15 59 Forearm-Wrist 10 52 - 31.1    Right Ulnar Sensory   Wrist-Dig V 2.1 2.8 60  > 8 124 Wrist-Dig V 12.5 60  > 48 30.2      Inter-Nerve Comparisons     Nerve 1 Value 1 Nerve 2 Value 2 Parameter Result Normal   Sensory Sites   R Median Palm-Wrist 1.98 ms R Ulnar Palm-Wrist 1.85 ms Peak Lat Diff 0.13 ms <0.40       Electromyography   Side Muscle Ins Act Fibs/PSW Fasc HF Amp Dur Poly Recrt Int Pat   Right Deltoid Incr None Nml 0 1+ Nml 0 Nml Nml   Right Triceps Nml None Nml 0 Nml Nml 0 Nml Nml   Right Biceps Incr 1+ Nml 0 Nml Nml 1+ Nml Nml   Right Pronator Teres Incr None Nml 0 Nml 1+ 1+ Nml Nml   Right FDI Nml None Nml 0 Nml Nml 0 Nml Nml   Right C6 PSP Incr None Nml 0 Nml 1+ 1+ Nml Nml       Waveforms / Images:     Motor         Sensory

## 2025-02-19 NOTE — Clinical Note
EMG-had these changes been in a signle muscle I would likely have said normal b/c they are so mild but when they follow a dermatomal pattern I had to call it. Whether this is source of actual symptoms is unclear. Also on exam he appears to be hypermobile  Alexa Nixon

## 2025-06-05 ENCOUNTER — TELEPHONE (OUTPATIENT)
Dept: ORTHOPEDICS | Facility: CLINIC | Age: 25
End: 2025-06-05
Payer: COMMERCIAL

## 2025-06-05 DIAGNOSIS — M54.12 CERVICAL RADICULOPATHY: Primary | ICD-10-CM

## 2025-06-05 NOTE — TELEPHONE ENCOUNTER
Other: pt calling as he wonders if a referral for PT will be placed?      Could we send this information to you in [place] or would you prefer to receive a phone call?:   Patient would prefer a phone call   Okay to leave a detailed message?: Yes at Cell number on file:    Telephone Information:   Mobile [phone]

## 2025-06-09 NOTE — TELEPHONE ENCOUNTER
Spoke with patient. He noted that he received a call last week to schedule PT, but was appreciative of call.     In addition he asked how to reach our office as [facility] will not allow him to send messages to Dr. Polo. Phone number was provided. No further action needed.     Krissy Orellana ATC

## 2025-07-08 ENCOUNTER — THERAPY VISIT (OUTPATIENT)
Dept: PHYSICAL THERAPY | Facility: REHABILITATION | Age: 25
End: 2025-07-08
Attending: STUDENT IN AN ORGANIZED HEALTH CARE EDUCATION/TRAINING PROGRAM
Payer: COMMERCIAL

## 2025-07-08 DIAGNOSIS — M54.12 CERVICAL RADICULOPATHY: ICD-10-CM

## 2025-07-08 DIAGNOSIS — M25.531 RIGHT WRIST PAIN: Primary | ICD-10-CM

## 2025-07-08 PROCEDURE — 97110 THERAPEUTIC EXERCISES: CPT | Mod: GP | Performed by: PHYSICAL THERAPIST

## 2025-07-08 PROCEDURE — 97140 MANUAL THERAPY 1/> REGIONS: CPT | Mod: GP | Performed by: PHYSICAL THERAPIST

## 2025-07-08 PROCEDURE — 97161 PT EVAL LOW COMPLEX 20 MIN: CPT | Mod: GP | Performed by: PHYSICAL THERAPIST

## 2025-07-08 NOTE — PROGRESS NOTES
PHYSICAL THERAPY EVALUATION  Type of Visit: Evaluation        Fall Risk Screen:  Have you fallen 2 or more times in the past year?: No  Have you fallen and had an injury in the past year?: No    Subjective 12  Pt reports that in Jan 2024 his right hand started burning badly for weeks.  He went to [facility] and send him to OT with [facility] for 7-8 months without much improvement.  Dr. Polo did EMG ruling out problem in the arm.  Dr. Polo then determined it must be coming from his neck.  He is in school for  and does a lot of video will which both increase his pain.  He also works at the [employer] about 23 hours a week and notices if he lifts more than 20lbs it causes the pain.  When he gets the pain it will linger for about an hour after he stops the activity.  Ibuprofen helps.  He reports he broke his right forearm when he was 8-10 years old that required surgery.  The pain was gone from there once it was healed.  When he is not doing something to aggravate it, it feels normal.  Just sitting at rest in the clinic now he notices a very mild tingling at the base of his right palm.  He states he feels the best when he is sleeping and right when he wakes up in the morning.          Presenting condition or subjective complaint: Difficulty using my right hand  Date of onset: 01/01/24    Relevant medical history:   Right forearm fracture with surgery from fall at [employer] when he was around 8-8 years old.  No pain after it healed.    Dates & types of surgery:  See above    Prior diagnostic imaging/testing results: X-ray   of right wrist only  Prior therapy history for the same diagnosis, illness or injury: Yes Some OT but only for the wrist    Living Environment  Social support:     Type of home: Apartment/condo   Stairs to enter the home: No       Ramp: No   Stairs inside the home: No       Help at home: None  Equipment owned:       Employment: Yes [employer] Employee  Hobbies/Interests: Video games, running,  painting, trading card games.    Patient goals for therapy: Use a computer with regularity, lift over 25lbs    Pain assessment: Pt c/o tingling and pain in his anterior right forearm and volar surface of his right hand that comes and goes, but mainly comes on with computer use.     Objective   CERVICAL SPINE EVALUATION  PAIN: Pain Level at Rest: 0/10  INTEGUMENTARY (edema, incisions): WNL  POSTURE: Sitting Posture: Rounded shoulders    ROM: AROM WNL. But minimal movement loss with B sidenbending and with right sidebening he has a slight increase in tingling in the base of his palm of his right hand.    MYOTOMES: WNL    DERMATOMES: WNL    NEURAL TENSION: Upper Limb Tension Test:  Positive Median nerve on right with slight increase in symptoms in the right palm with this.  L negative.  Radial and Ulnar negative B.    SPECIAL TESTS:    Left Right   Alar Ligament Negative    Cervical Flexion-Rotation Negative  Negative    Cervical Rot/Lateral Flex Negative  Negative    Compression Negative  Negative    Distraction Negative  Negative    Spurling s Negative  Negative    Thoracic Outlet Screen (Pooja, Adson)     Transverse Ligament Negative  Negative    Vertebral Artery     Cotton Roll Test     Craniocervical Flexor Endurance Test     Mannheimer Test            PALPATION: Pt with minimal tightness and tenderness in his neck and upper back.  Most notable were some mild tightness and tenderness in B lateral neck.    SPINAL SEGMENTAL P/A Assessment:  Pt with normal cervical and upper thoracic mobility with P/As.  Only one tender was T2.    Assessment & Plan   CLINICAL IMPRESSIONS  Medical Diagnosis: Cervical Radiculopathy    Treatment Diagnosis: Right arm pain, postural weakness   Impression/Assessment: Patient is a 25 year old male with Right forearm and palm tingling and pain complaints.  The following significant findings have been identified: Pain and Impaired posture. These impairments interfere with their ability to  perform work tasks as compared to previous level of function.     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  Rationale: to maximize safety and independence within the community  Target Date: 07/20/25  PT Goal 2  Goal Identifier: Computer use  Goal Description: Pt will be able to play on his computer for video games for 30 minutes without an increase in his right arm pain or tingling.  Rationale: to maximize safety and independence with performance of ADLs and functional tasks  Target Date: 09/06/25  PT Goal 3  Goal Identifier: Posture  Goal Description: Pt will be able to maintain a good posture when working on the computer for school work without an increae in right arm symptoms in 12 weeks  Rationale: to maximize safety and independence within the community  Target Date: 10/06/25      Frequency of Treatment: 1x/week  Duration of Treatment: 12 weeks    Education Assessment:   Learner/Method: Pictures/Video;Demonstration;Reading;Listening    Risks and benefits of evaluation/treatment have been explained.   Patient/Family/caregiver agrees with Plan of Care.     Evaluation Time:     PT Eval, Low Complexity Minutes (31244): 25       Signing Clinician: Linda Acosta, PT        [facility]                                                                                   OUTPATIENT PHYSICAL THERAPY      PLAN OF TREATMENT FOR  OUTPATIENT REHABILITATION   Patient's Last Name, First Name, Mick Jules YOB: 2000   Provider's Name   [facility]   Medical Record No.  [id number]     Onset Date: 01/01/24  Start of Care Date: 07/08/25     Medical Diagnosis:  Cervical Radiculopathy      PT Treatment Diagnosis:  Right arm pain, postural weakness Plan of Treatment  Frequency/Duration: 1x/week/ 12 weeks    Certification date from 07/08/25 to 10/06/25         See note for plan of treatment details and functional goals     Linda Acosta, PT                         I CERTIFY THE NEED FOR THESE SERVICES FURNISHED UNDER        THIS PLAN OF TREATMENT AND WHILE UNDER MY CARE     (Physician attestation of this document indicates review and certification of the therapy plan).              Referring Provider:  Judson Polo    Initial Assessment  See Epic Evaluation- Start of Care Date: 07/08/25

## 2025-07-17 ENCOUNTER — TELEPHONE (OUTPATIENT)
Dept: PHYSICAL THERAPY | Facility: REHABILITATION | Age: 25
End: 2025-07-17

## 2025-07-17 NOTE — TELEPHONE ENCOUNTER
I called Mick and left a message regarding his missed appointment this morning.    I also reminded him of his next scheduled visit (8/6/25 at 12:30 p.m.) and asked that he call at least 24 hours in advance if he needs to cancel or reschedule.

## 2025-08-06 ENCOUNTER — THERAPY VISIT (OUTPATIENT)
Dept: PHYSICAL THERAPY | Facility: REHABILITATION | Age: 25
End: 2025-08-06
Payer: COMMERCIAL

## 2025-08-06 DIAGNOSIS — M25.531 RIGHT WRIST PAIN: ICD-10-CM

## 2025-08-06 DIAGNOSIS — M54.12 CERVICAL RADICULOPATHY: Primary | ICD-10-CM

## 2025-08-06 PROCEDURE — 97140 MANUAL THERAPY 1/> REGIONS: CPT | Mod: GP | Performed by: PHYSICAL THERAPIST

## 2025-08-06 PROCEDURE — 97110 THERAPEUTIC EXERCISES: CPT | Mod: GP | Performed by: PHYSICAL THERAPIST

## 2025-08-13 ENCOUNTER — THERAPY VISIT (OUTPATIENT)
Dept: PHYSICAL THERAPY | Facility: REHABILITATION | Age: 25
End: 2025-08-13
Payer: COMMERCIAL

## 2025-08-13 DIAGNOSIS — M25.531 RIGHT WRIST PAIN: ICD-10-CM

## 2025-08-13 DIAGNOSIS — M54.12 CERVICAL RADICULOPATHY: Primary | ICD-10-CM

## 2025-08-13 PROCEDURE — 97110 THERAPEUTIC EXERCISES: CPT | Mod: GP | Performed by: PHYSICAL THERAPIST

## 2025-08-13 PROCEDURE — 97140 MANUAL THERAPY 1/> REGIONS: CPT | Mod: GP | Performed by: PHYSICAL THERAPIST

## 2025-08-20 ENCOUNTER — THERAPY VISIT (OUTPATIENT)
Dept: PHYSICAL THERAPY | Facility: REHABILITATION | Age: 25
End: 2025-08-20
Payer: COMMERCIAL

## 2025-08-20 DIAGNOSIS — M54.12 CERVICAL RADICULOPATHY: Primary | ICD-10-CM

## 2025-08-20 PROCEDURE — 97140 MANUAL THERAPY 1/> REGIONS: CPT | Mod: GP | Performed by: PHYSICAL THERAPIST

## 2025-08-20 PROCEDURE — 97110 THERAPEUTIC EXERCISES: CPT | Mod: GP | Performed by: PHYSICAL THERAPIST

## 2025-09-03 ENCOUNTER — THERAPY VISIT (OUTPATIENT)
Dept: PHYSICAL THERAPY | Facility: REHABILITATION | Age: 25
End: 2025-09-03
Payer: COMMERCIAL

## 2025-09-03 DIAGNOSIS — M25.531 RIGHT WRIST PAIN: ICD-10-CM

## 2025-09-03 DIAGNOSIS — M54.12 CERVICAL RADICULOPATHY: Primary | ICD-10-CM

## 2025-09-03 PROCEDURE — 97110 THERAPEUTIC EXERCISES: CPT | Mod: GP | Performed by: PHYSICAL THERAPIST

## 2025-09-03 PROCEDURE — 97140 MANUAL THERAPY 1/> REGIONS: CPT | Mod: GP | Performed by: PHYSICAL THERAPIST
